# Patient Record
Sex: FEMALE | Race: BLACK OR AFRICAN AMERICAN | NOT HISPANIC OR LATINO | Employment: FULL TIME | URBAN - METROPOLITAN AREA
[De-identification: names, ages, dates, MRNs, and addresses within clinical notes are randomized per-mention and may not be internally consistent; named-entity substitution may affect disease eponyms.]

---

## 2017-03-16 ENCOUNTER — OFFICE VISIT (OUTPATIENT)
Dept: NEUROLOGY | Facility: MEDICAL CENTER | Age: 52
End: 2017-03-16
Payer: COMMERCIAL

## 2017-03-16 VITALS
BODY MASS INDEX: 27.89 KG/M2 | DIASTOLIC BLOOD PRESSURE: 80 MMHG | OXYGEN SATURATION: 97 % | TEMPERATURE: 98.2 F | HEIGHT: 68 IN | HEART RATE: 82 BPM | SYSTOLIC BLOOD PRESSURE: 108 MMHG | WEIGHT: 184 LBS | RESPIRATION RATE: 16 BRPM

## 2017-03-16 DIAGNOSIS — R93.0 ABNORMAL MRI OF HEAD: Primary | ICD-10-CM

## 2017-03-16 DIAGNOSIS — G25.0 BENIGN FAMILIAL TREMOR: ICD-10-CM

## 2017-03-16 DIAGNOSIS — G43.009 MIGRAINE WITHOUT AURA AND WITHOUT STATUS MIGRAINOSUS, NOT INTRACTABLE: ICD-10-CM

## 2017-03-16 PROCEDURE — 99205 OFFICE O/P NEW HI 60 MIN: CPT | Performed by: PSYCHIATRY & NEUROLOGY

## 2017-03-16 RX ORDER — ASPIRIN 325 MG
325 TABLET ORAL EVERY 6 HOURS PRN
COMMUNITY
End: 2017-05-25

## 2017-03-16 RX ORDER — MEDROXYPROGESTERONE ACETATE 150 MG/ML
150 INJECTION, SUSPENSION INTRAMUSCULAR ONCE
COMMUNITY
End: 2017-11-29

## 2017-03-16 ASSESSMENT — ENCOUNTER SYMPTOMS
HEADACHES: 0
DEPRESSION: 0
SENSORY CHANGE: 0
FOCAL WEAKNESS: 0
BLURRED VISION: 1
MEMORY LOSS: 0
TINGLING: 0
TREMORS: 1
DOUBLE VISION: 0

## 2017-03-16 NOTE — MR AVS SNAPSHOT
"        Guerda Dirk   3/16/2017 4:00 PM   Office Visit   MRN: [id number]    Department:  Neurology [facility]   Dept Phone:  [phone]    Description:  Female : 1965   Provider:  Bradley Argueta M.D.           Reason for Visit     New Patient Multiple sclerosis      Allergies as of 3/16/2017     Allergen Noted Reactions    Other Food 2016   Itching, Swelling    Coconut and peanut  RXN=ongoing    Coconut Fatty Acids 2016       Coconut causes throat swelling per RN.     Peanut-Derived 2016       Peanuts cause throat swelling.       Vital Signs     Blood Pressure Pulse Temperature Respirations Height Weight    108/80 mmHg 82 36.8 °C (98.2 °F) 16 1.727 m (5' 7.99\") 83.462 kg (184 lb)    Body Mass Index Oxygen Saturation Smoking Status             27.98 kg/m2 97% Former Smoker         Basic Information     Date Of Birth Sex Race Ethnicity Preferred Language    1965 Female Black or  Non- English      Your appointments     May 25, 2017  4:40 PM   Follow Up Visit with Bradley Argueta M.D.   [facility] (--)    [address], [address]  [facility] [address] [phone]           You will be receiving a confirmation call a few days before your appointment from our automated call confirmation system.              Problem List              ICD-10-CM Priority Class Noted - Resolved    Degenerative cervical disc M50.30   2016 - Present      Health Maintenance     Patient has no pending health maintenance at this time      Current Immunizations     No immunizations on file.      Below and/or attached are the medications your provider expects you to take. Review all of your home medications and newly ordered medications with your provider and/or pharmacist. Follow medication instructions as directed by your provider and/or pharmacist. Please keep your medication list with you and share with your provider. Update the information when " medications are discontinued, doses are changed, or new medications (including over-the-counter products) are added; and carry medication information at all times in the event of emergency situations     Allergies:  OTHER FOOD - Itching,Swelling     COCONUT FATTY ACIDS - (reactions not documented)     PEANUT-DERIVED - (reactions not documented)               Medications  Valid as of: March 16, 2017 -  4:53 PM    Generic Name Brand Name Tablet Size Instructions for use    Aspirin (Tab)  MG Take 325 mg by mouth every 6 hours as needed.        Cyclobenzaprine HCl (Tab) FLEXERIL 10 MG Take 1 Tab by mouth 3 times a day as needed for Muscle Spasms.        DULoxetine HCl (Cap DR Particles) CYMBALTA 60 MG Take 60 mg by mouth every day.        Eletriptan Hydrobromide (Tab) RELPAX 40 MG Take 40 mg by mouth Once PRN.        MedroxyPROGESTERone Acetate (Suspension) DEPO-PROVERA 150 MG/ mg by Intramuscular route Once.        MethylPREDNISolone (Kit) MEDROL DOSEPACK 4 MG Take as directed        Non Formulary Request Non Formulary Request  Take 1 Each by mouth every day. Amira Flush laxative  daily        Ondansetron (TABLET DISPERSIBLE) ZOFRAN ODT 4 MG Take 4 mg by mouth every 8 hours as needed for Nausea/Vomiting.        Sennosides   Take 1 Tab by mouth every day.        Zolpidem Tartrate (Tab) AMBIEN 5 MG Take 10 mg by mouth at bedtime as needed for Sleep.        .                 Medicines prescribed today were sent to:     [id number] [address] [facility]    [address] [address]    Phone: [phone] Fax: [phone]    Open 24 Hours?: No      Medication refill instructions:       If your prescription bottle indicates you have medication refills left, it is not necessary to call your provider’s office. Please contact your pharmacy and they will refill your medication.    If your prescription bottle indicates you do not have any refills left, you may request  refills at any time through one of the following ways: The online [employer] system (except Urgent Care), by calling your provider’s office, or by asking your pharmacy to contact your provider’s office with a refill request. Medication refills are processed only during regular business hours and may not be available until the next business day. Your provider may request additional information or to have a follow-up visit with you prior to refilling your medication.   *Please Note: Medication refills are assigned a new Rx number when refilled electronically. Your pharmacy may indicate that no refills were authorized even though a new prescription for the same medication is available at the pharmacy. Please request the medicine by name with the pharmacy before contacting your provider for a refill.           [employer] Access Code: [email or web address]  Expires: 4/15/2017  3:42 PM    [employer]  A secure, online tool to manage your health information     [employer]® is a secure, online tool that connects you to your personalized health information from the privacy of your home -- day or night - making it very easy for you to manage your healthcare. Once the activation process is completed, you can even access your medical information using the [employer] reshma, which is available for free in the Apple Reshma store or Google Play store.     [employer] provides the following levels of access (as shown below):   My Chart Features   [facility] Doctor [facility]  Specialists [facility]  Urgent  Care Non-[facility]  Primary Care  Doctor   Email your healthcare team securely and privately 24/7 X X X    Manage appointments: schedule your next appointment; view details of past/upcoming appointments X      Request prescription refills. X      View recent personal medical records, including lab and immunizations X X X X   View health record, including health history, allergies, medications X X X X   Read reports about your outpatient visits,  procedures, consult and ER notes X X X X   See your discharge summary, which is a recap of your hospital and/or ER visit that includes your diagnosis, lab results, and care plan. X X       How to register for [employer]:  1. Go to  [email or web address].  2. Click on the Sign Up Now box, which takes you to the New Member Sign Up page. You will need to provide the following information:  a. Enter your [employer] Access Code exactly as it appears at the top of this page. (You will not need to use this code after you’ve completed the sign-up process. If you do not sign up before the expiration date, you must request a new code.)   b. Enter your date of birth.   c. Enter your home email address.   d. Click Submit, and follow the next screen’s instructions.  3. Create a [employer] ID. This will be your [employer] login ID and cannot be changed, so think of one that is secure and easy to remember.  4. Create a [employer] password. You can change your password at any time.  5. Enter your Password Reset Question and Answer. This can be used at a later time if you forget your password.   6. Enter your e-mail address. This allows you to receive e-mail notifications when new information is available in [employer].  7. Click Sign Up. You can now view your health information.    For assistance activating your [employer] account, call [phone]

## 2017-03-17 ENCOUNTER — HOSPITAL ENCOUNTER (OUTPATIENT)
Dept: RADIOLOGY | Facility: MEDICAL CENTER | Age: 52
End: 2017-03-17

## 2017-03-17 NOTE — PROGRESS NOTES
Subjective:      Guerda Cavazos is a 51 y.o. female who presents for consultation, with a history of bilateral eye inflammation including allergic reaction and increased intraocular pressure, but whose MRI scan of the brain was abnormal suggestive of possible MS.     HPI    Ms. Cavazos is a pleasant right-handed -American female whose eyes symptoms began in September, 2016. She had some transient visual obscurations, involving both eyes, right greater than left. She was given some eyedrops and immediately developed a severe allergic reaction, affecting acuity bilaterally. It required ophthalmic steroids and other eyedrops. Intraocular pressures were found to be elevated. She followed under the care of her ophthalmologist throughout this time. Over a couple of months things slowly and steadily improved, the visual obscuration as well, but during this time MRI scan of the orbits as well as the brain were also done. The former study was normal, the latter revealed nonspecific white matter changes involving the deep white matter and juxtacortical regions of both cerebral hemispheres, with an anterior predominance (?). As a result, she was forwarded to several specialists including a rheumatologist and neurologist. She also was sent to a retinal specialist and evidently they found nothing of significance that required further follow-up.    Initially blood work was unremarkable, sedimentation rate was 2. Seen by Dr. Lorenzo from a neurologic standpoint, carotid ultrasound and CSF studies were obtained, but she does not know these results. Because of insurance changes, she was never able to follow-up. Her referral to a rheumatologist is pending.    She denies any history of transient neurologic deficits lasting for days or weeks at a time with spontaneous resolution. His includes nothing to suggest paraparesis, hemiparesis or sensory deficit, JAIRO, changes in bowel or bladder function, constrictive sensations around  the mid rib or chest, etc. She is not dealing with fatigue, even with the ophthalmologic problems, there was never double vision. Cognition has remained intact throughout. She has some mild chronic dyspnea, she denies hemoptysis or hematemesis, persistent pleurisy, or increasing shortness of breath.    She has a history of migraine without aura, starting in mid adult life. These are incapacitating, nausea and vomiting, heightened sensitivities, etc. She has never had any neurologic symptoms in association. These are infrequent, she uses Relpax with good benefit. She has a history of sickle cell trait and has noted tremulousness in the right hand, increasing with action, improved when she is at rest. Medical history is otherwise unremarkable for cerebrovascular risk including KIERAN, nor diagnose demyelinating disease, neurodegenerative disease, rheumatoid disease, sarcoid, anemia, liver or kidney disease, malignancy, thyroid disease, dyslipidemia, pulmonary disease, or seizures. She is status post cervical fusion surgeries on 2 different occasions, one in July, 2015, the next in April, 2016, these for neck pain and left upper extremity paresthesias and weakness. No one in the family has a history of stroke or demyelinating disease. Her mother has tremor, breast cancer and hypertension, both her brothers have hypertension, her one daughter is alive and well. She quit tobacco use in 2013, she drinks maybe 2 alcoholic beverages every once in a while. 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 Her significant other is a private .    She is on Relpax 40 mg daily, Depo Provera, adult aspirin daily, Cymbalta 60 mg daily, the rest as per the electronic health record, of no clinical significance from a neurologic standpoint.    Review of Systems   Constitutional: Negative for malaise/fatigue.   Eyes: Positive for blurred vision. Negative for double vision.   Neurological:  "Positive for tremors. Negative for tingling, sensory change, focal weakness and headaches.   Psychiatric/Behavioral: Negative for depression and memory loss.   All other systems reviewed and are negative.       Objective:     /80 mmHg  Pulse 82  Temp(Src) 36.8 °C (98.2 °F)  Resp 16  Ht 1.727 m (5' 7.99\")  Wt 83.462 kg (184 lb)  BMI 27.98 kg/m2  SpO2 97%     Physical Exam    She appears in no acute distress. Vital signs are stable. There was no malar rash, jaw or temporal tenderness, or jaw claudication. The neck is supple, carotid pulses are present bilaterally without asymmetry or bruits. Cardiac evaluation reveals a regular rhythm. There is no evidence of lower extremity edema, nor evidence of diffuse rash or bruising.    Fully oriented, there is no aphasia, agnosia, apraxia, or inattention.    PERRLA/EOMI, there is a reduction in visual acuity bilaterally with a hand-held chart, visual fields are still full to finger counting bilaterally, funduscopic exam does not reveal pallor, disc margins are sharp. There is no facial asymmetry, sensory exam is intact to temperature and pinprick across the midline, the tongue and uvula are midline, jaw jerk is present, shoulder shrug is symmetric. There is no bulbar dysfunction, hypophonia or bradykinesia.    A fine tremulousness is seen with the right hand when it is held against gravity, of low amplitude high frequency. Tone is normal throughout, there is no tremor at rest, there is no drift or asterixis. Strength and tone are intact throughout. Reflexes are present at all points though the left triceps jerk is slightly diminished when compared to the right, the rest are symmetric. Both toes are downgoing.    Repetitive movements with the hands, fingers and feet are intact with symmetric and normal amplitude and frequencies. There is no appendicular dystaxia, the tremulousness with right arm and hand is present will for range of motion, does not increase as she " approaches the target. She walks with normal station, heel, toe, and tandem walking are intact.    Sensory exam is intact to temperature, vibration and pinprick throughout. Romberg is absent.     Assessment/Plan:     1. Abnormal MRI of head  I'm not quite sure what to make of the findings by report, we will obtain a copy of the actual images so that I can review them. Nonspecific white matter lesions are just that, nonspecific and typically nondiagnostic. They can be seen as part of normal aging, migraine suffers also are at risk, they can also be seen in people with risk for cerebrovascular, atherosclerotic disease, though she lacks a history of risk other than her tobacco use and race. These are not likely a cause for the eye symptoms nor the eye problems she has suffered from. Still, she did see Dr. Lorenzo, and it seems that he probably did the right workup including CSF studies, blood work, carotid ultrasounds, and I would assume an echocardiogram. The differential would include atherosclerotic disease, demyelinating disease (I doubt this simply based on the fact of age, race, absence of history of recurrent relapsing neurologic deficits, and normal neurologic exam), vasculitis, granulomatous disease, infection, encephalitis, etc. I will not be ordering additional tests until I know what has already been done and what these results are. With a normal exam, a benign history, etc., I doubt we are going to find something significant.    2. Benign familial tremor  An incidental finding, this is not Parkinson's disease. I recommended she have a drink to see if in fact there is improvement, and if so, the diagnosis is cinched. The nature of benign familial tremor was reviewed in full.    3. Migraine without aura and without status migrainosus, not intractable  Stable at this time, she is using Relpax appropriately. This may be part of the reason why we are seeing these white matter changes on imaging, though it is  typically migraine with aura that an increased incidence of these abnormalities. The distribution is more often a posterior white matter localization as well. Still, I need to review the images to get a better sense of where things are. Face-to-face time was spent reviewing all of this as well.    We can follow up with each other once I have all of the data available for review, additional tests can then be ordered. He will continue to follow-up with her ophthalmologist, evidently she is scheduled for cataract surgery next week.

## 2017-05-17 ENCOUNTER — TELEPHONE (OUTPATIENT)
Dept: NEUROLOGY | Facility: MEDICAL CENTER | Age: 52
End: 2017-05-17

## 2017-05-17 NOTE — TELEPHONE ENCOUNTER
Pt is calling and asking if she should keep her appointment with Dr. Argueta. Informed pt that since she is still having the migraines she should keep her appointment with the him. Pt verbally understood and will comply with all given. EMMANUEL

## 2017-05-25 ENCOUNTER — OFFICE VISIT (OUTPATIENT)
Dept: NEUROLOGY | Facility: MEDICAL CENTER | Age: 52
End: 2017-05-25
Payer: COMMERCIAL

## 2017-05-25 VITALS
WEIGHT: 184 LBS | DIASTOLIC BLOOD PRESSURE: 86 MMHG | TEMPERATURE: 98.6 F | BODY MASS INDEX: 27.89 KG/M2 | HEART RATE: 79 BPM | RESPIRATION RATE: 16 BRPM | HEIGHT: 68 IN | SYSTOLIC BLOOD PRESSURE: 124 MMHG | OXYGEN SATURATION: 97 %

## 2017-05-25 DIAGNOSIS — G43.009 MIGRAINE WITHOUT AURA AND WITHOUT STATUS MIGRAINOSUS, NOT INTRACTABLE: Primary | ICD-10-CM

## 2017-05-25 DIAGNOSIS — R93.0 ABNORMAL MRI OF HEAD: ICD-10-CM

## 2017-05-25 DIAGNOSIS — G25.0 BENIGN FAMILIAL TREMOR: ICD-10-CM

## 2017-05-25 PROCEDURE — 99214 OFFICE O/P EST MOD 30 MIN: CPT | Performed by: PSYCHIATRY & NEUROLOGY

## 2017-05-25 RX ORDER — ALMOTRIPTAN 12.5 MG/1
TABLET, FILM COATED ORAL
Qty: 12 TAB | Refills: 6 | Status: SHIPPED | OUTPATIENT
Start: 2017-05-25 | End: 2017-11-29

## 2017-05-25 RX ORDER — PROPRANOLOL HCL 60 MG
CAPSULE, EXTENDED RELEASE 24HR ORAL
Qty: 60 CAP | Refills: 3 | Status: SHIPPED | OUTPATIENT
Start: 2017-05-25 | End: 2017-09-15

## 2017-05-25 RX ORDER — OXYCODONE HYDROCHLORIDE 5 MG/1
5 TABLET ORAL EVERY 4 HOURS PRN
COMMUNITY
End: 2017-11-29

## 2017-05-25 ASSESSMENT — ENCOUNTER SYMPTOMS
TREMORS: 1
BLURRED VISION: 1
DEPRESSION: 0
MEMORY LOSS: 0
HEADACHES: 1
LOSS OF CONSCIOUSNESS: 0

## 2017-05-25 NOTE — MR AVS SNAPSHOT
"        Guerda Cavazos   2017 4:40 PM   Office Visit   MRN: [id number]    Department[facility]  [facility]   Dept Phone:  [phone]    Description:  Female : 1965   Provider:  Bradley Argueta M.D.           Reason for Visit     Follow-Up Migraine, Relpax not working. Pt states that she was diagnosed with UCTD, prescribed methotrexate by Dr. Geneva Murillo. Advised to stop Relpax by Dr. Reid. Pt has also noticed unexplained bruising, and weakness while gripping. Still experiencing trouble with her eyes.      Allergies as of 2017     Allergen Noted Reactions    Other Food 2016   Itching, Swelling    Coconut and peanut  RXN=ongoing    Coconut Fatty Acids 2016       Coconut causes throat swelling per RN.     Peanut-Derived 2016       Peanuts cause throat swelling.       You were diagnosed with     Migraine without aura and without status migrainosus, not intractable   [[id number]]  -  Primary     Benign familial tremor   [[id number]]         Vital Signs     Blood Pressure Pulse Temperature Respirations Height Weight    124/86 mmHg 79 37 °C (98.6 °F) 16 1.727 m (5' 8\") 83.462 kg (184 lb)    Body Mass Index Oxygen Saturation Smoking Status             27.98 kg/m2 97% Former Smoker         Basic Information     Date Of Birth Sex Race Ethnicity Preferred Language    1965 Female Black or  Non- English      Problem List              ICD-10-CM Priority Class Noted - Resolved    Degenerative cervical disc M50.30   2016 - Present    Benign familial tremor G25.0   3/16/2017 - Present    Abnormal MRI of head R93.0   3/16/2017 - Present    Migraine without aura and without status migrainosus, not intractable G43.009   3/16/2017 - Present      Health Maintenance        Date Due Completion Dates    IMM DTaP/Tdap/Td Vaccine (1 - Tdap) 1983 ---    PAP SMEAR 1985 ---    MAMMOGRAM 2004 ---    COLONOSCOPY 2014 ---            Current Immunizations  "    No immunizations on file.      Below and/or attached are the medications your provider expects you to take. Review all of your home medications and newly ordered medications with your provider and/or pharmacist. Follow medication instructions as directed by your provider and/or pharmacist. Please keep your medication list with you and share with your provider. Update the information when medications are discontinued, doses are changed, or new medications (including over-the-counter products) are added; and carry medication information at all times in the event of emergency situations     Allergies:  OTHER FOOD - Itching,Swelling     COCONUT FATTY ACIDS - (reactions not documented)     PEANUT-DERIVED - (reactions not documented)               Medications  Valid as of: May 25, 2017 -  5:07 PM    Generic Name Brand Name Tablet Size Instructions for use    Almotriptan Malate (Tab) AXERT 12.5 MG 1 tab at headache onset; repeat in 1 hour prn        MedroxyPROGESTERone Acetate (Suspension) DEPO-PROVERA 150 MG/ mg by Intramuscular route Once.        Non Formulary Request Non Formulary Request  Take 1 Each by mouth every day. Amira Flush laxative  daily        Ondansetron (TABLET DISPERSIBLE) ZOFRAN ODT 4 MG Take 4 mg by mouth every 8 hours as needed for Nausea/Vomiting.        OxyCODONE HCl (Tab) ROXICODONE 5 MG Take 5 mg by mouth every four hours as needed for Severe Pain.        Propranolol HCl (CAPSULE SR 24 HR) INDERAL LA 60 MG 1 tab qAM for 1 week, then 2 tab qAM        Zolpidem Tartrate (Tab) AMBIEN 5 MG Take 10 mg by mouth at bedtime as needed for Sleep.        .                 Medicines prescribed today were sent to:     [id number] [address] [address]    Phone: [phone] Fax: [phone]    Open 24 Hours?: No      Medication refill instructions:       If your prescription bottle indicates you have medication refills left, it is  not necessary to call your provider’s office. Please contact your pharmacy and they will refill your medication.    If your prescription bottle indicates you do not have any refills left, you may request refills at any time through one of the following ways: The online [employer] system (except Urgent Care), by calling your provider’s office, or by asking your pharmacy to contact your provider’s office with a refill request. Medication refills are processed only during regular business hours and may not be available until the next business day. Your provider may request additional information or to have a follow-up visit with you prior to refilling your medication.   *Please Note: Medication refills are assigned a new Rx number when refilled electronically. Your pharmacy may indicate that no refills were authorized even though a new prescription for the same medication is available at the pharmacy. Please request the medicine by name with the pharmacy before contacting your provider for a refill.           [employer] Access Code: Activation code not generated  Current [employer] Status: Active

## 2017-05-26 NOTE — PROGRESS NOTES
Subjective:      Guerda Cavazos is a 51 y.o. female who presents for follow-up with a history of migraine without aura, benign familial tremor and an incidentally abnormal MRI of the brain.    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 Still, she was having maybe 5-6 headaches every month, rescue was effective if she could take them at the right time. Unfortunately, when methotrexate was initiated, the headaches ramped up significantly, she began to notice skin hyperpathia on both sides of the face. Relpax had been working, but this limited efficacy has now become a chapter in the history books. She also has failed Imitrex and Maxalt. When last seen, we had not started her on maintenance therapy since the eye issues were more paramount. In the past she has failed Topamax and Depakote.    Abnormal MRI: I did finally get a copy of films on CD-ROM, the abnormalities were nonspecific, nothing really consistent with demyelinating disease.    Benign tremor: Minimal, never impairing her ability to function nor becoming an embarrassment, it remains in the hands.    She did undergo cervical decompressive surgery, this provided significant benefit for the neck pain, obviously, but also for the headaches. Medical, surgical and family histories are reviewed otherwise, there are no new drug allergies. She was taken off methotrexate, given leucovorin, they are now deciding what medication she might be able to use effectively. She is on Ambien, Zofran and Depo Provera.    Review of Systems   Eyes: Positive for blurred vision.   Neurological: Positive for tremors and headaches. Negative for loss of consciousness.   Psychiatric/Behavioral: Negative for depression and memory loss.   All other systems reviewed and are negative.       Objective:     /86 mmHg  Pulse 79  Temp(Src) 37 °C  "(98.6 °F)  Resp 16  Ht 1.727 m (5' 8\")  Wt 83.462 kg (184 lb)  BMI 27.98 kg/m2  SpO2 97%     Physical Exam    She appears in no acute distress. Vital signs are stable. There is no malar rash, jaw claudication, there is no facial or scalp allodynia. The neck is supple, range of motion is full. Cardiac evaluation is unremarkable. In quick and cursory fashion, mental status, cranial nerve, motor, coordination, sensory evaluation of remain intact.     Assessment/Plan:     1. Migraine without aura and without status migrainosus, not intractable  She will likely require maintenance therapy, the hopefully in time we will get her back to the headache frequency she had prior to being on methotrexate. Inderal LA 60 mg daily for one week, then 120 mg daily. Side effects were reviewed. Axert 12.5 mg we used as rescue, taken like she had Relpax, but she was told to repeat the dose in an hour if she needed to. Phone contact in the interim. Six-month follow-up is scheduled.    - propranolol LA (INDERAL LA) 60 MG CAPSULE SR 24 HR; 1 tab qAM for 1 week, then 2 tab qAM  Dispense: 60 Cap; Refill: 3  - almotriptan (AXERT) 12.5 MG tablet; 1 tab at headache onset; repeat in 1 hour prn  Dispense: 12 Tab; Refill: 6    2. Benign familial tremor  Stable, Inderal is something that can help the tremulousness itself, we may kill 2 birds with one stone in this regard.    - propranolol LA (INDERAL LA) 60 MG CAPSULE SR 24 HR; 1 tab qAM for 1 week, then 2 tab qAM  Dispense: 60 Cap; Refill: 3    3. Abnormal MRI of head  Most likely coincidental, possibly even a normal variant, the patient's with migraine can have these types of changes. Was very happy to tell her I did not believe that these were lesions consistent with demyelinating disease. MRI of the cervical spine which was done prior to her decompressive surgery demonstrated no intramedullary cord signal abnormality, also a good sign that the brain abnormalities were not signs of a more " diffuse, CNS demyelinating disease.    Time: Evaluation of 25 minutes for exam come review, discussion, education  Discussion: As mentioned in assessment, over 50% of time spent face-to-face counseling and coordinating care

## 2017-09-12 ENCOUNTER — TELEPHONE (OUTPATIENT)
Dept: NEUROLOGY | Facility: MEDICAL CENTER | Age: 52
End: 2017-09-12

## 2017-09-12 DIAGNOSIS — G43.009 MIGRAINE WITHOUT AURA AND WITHOUT STATUS MIGRAINOSUS, NOT INTRACTABLE: ICD-10-CM

## 2017-09-12 NOTE — TELEPHONE ENCOUNTER
Received a call from this patient for Dr. Argueta stating that the medications for her migraines are not working. She is wondering if she could have a better/ different rescue medication as the ones she currently has are not very effective. Please advise.

## 2017-09-12 NOTE — TELEPHONE ENCOUNTER
Find out the patient if she is still on methotrexate, the headaches began to increase when she had started this drug. I had informed her this could be a problem. Make sure she is also on Inderal  mg daily, the minimum dose required to see if there is going to be benefit. Also see if her tremor has improved with the Inderal. If she is still on methotrexate, has been on the higher dose of Inderal, the headaches obviously persisting, we can try another maintenance drug, but I suspect we are not going to get any benefit until she gets off methotrexate. If she is off methotrexate, the headaches have continued unabated, then trying another maintenance therapy is still indicated, but at least she stands a chance of getting headaches under better control.

## 2017-09-15 RX ORDER — ONDANSETRON 4 MG/1
4 TABLET, ORALLY DISINTEGRATING ORAL EVERY 8 HOURS PRN
Qty: 30 TAB | Refills: 3 | Status: SHIPPED | OUTPATIENT
Start: 2017-09-15 | End: 2018-05-30 | Stop reason: SDUPTHER

## 2017-09-15 RX ORDER — NIMODIPINE 30 MG/1
30 CAPSULE, LIQUID FILLED ORAL 2 TIMES DAILY
Qty: 60 CAP | Refills: 4 | Status: SHIPPED | OUTPATIENT
Start: 2017-09-15 | End: 2017-09-21

## 2017-09-15 NOTE — TELEPHONE ENCOUNTER
Have her stop the Inderal, we will need to wait one week before starting another maintenance drug. I put Nimotop 30 mg, twice a day, into Epic.

## 2017-09-21 ENCOUNTER — TELEPHONE (OUTPATIENT)
Dept: NEUROLOGY | Facility: MEDICAL CENTER | Age: 52
End: 2017-09-21

## 2017-09-21 DIAGNOSIS — G43.009 MIGRAINE WITHOUT AURA AND WITHOUT STATUS MIGRAINOSUS, NOT INTRACTABLE: ICD-10-CM

## 2017-09-21 RX ORDER — AMLODIPINE BESYLATE 2.5 MG/1
TABLET ORAL
Qty: 120 TAB | Refills: 2 | Status: SHIPPED | OUTPATIENT
Start: 2017-09-21 | End: 2017-11-02 | Stop reason: SDUPTHER

## 2017-09-22 NOTE — TELEPHONE ENCOUNTER
Patient called for Dr. Argueta and is wondering if he can prescribe a different medication in place of the nimipodine? It does not require prior authorization but her copay is over $300 and she can't afford this. Can you prescribe something cheaper?

## 2017-11-02 DIAGNOSIS — G43.009 MIGRAINE WITHOUT AURA AND WITHOUT STATUS MIGRAINOSUS, NOT INTRACTABLE: ICD-10-CM

## 2017-11-04 RX ORDER — AMLODIPINE BESYLATE 2.5 MG/1
TABLET ORAL
Qty: 120 TAB | Refills: 2 | Status: SHIPPED | OUTPATIENT
Start: 2017-11-04 | End: 2017-11-29

## 2017-11-29 ENCOUNTER — OFFICE VISIT (OUTPATIENT)
Dept: NEUROLOGY | Facility: MEDICAL CENTER | Age: 52
End: 2017-11-29
Payer: COMMERCIAL

## 2017-11-29 VITALS
WEIGHT: 189.9 LBS | OXYGEN SATURATION: 97 % | HEART RATE: 84 BPM | HEIGHT: 68 IN | TEMPERATURE: 97 F | SYSTOLIC BLOOD PRESSURE: 108 MMHG | DIASTOLIC BLOOD PRESSURE: 78 MMHG | RESPIRATION RATE: 14 BRPM | BODY MASS INDEX: 28.78 KG/M2

## 2017-11-29 DIAGNOSIS — G43.009 MIGRAINE WITHOUT AURA AND WITHOUT STATUS MIGRAINOSUS, NOT INTRACTABLE: Primary | ICD-10-CM

## 2017-11-29 PROCEDURE — 99214 OFFICE O/P EST MOD 30 MIN: CPT | Performed by: PSYCHIATRY & NEUROLOGY

## 2017-11-29 RX ORDER — GABAPENTIN 300 MG/1
CAPSULE ORAL
Qty: 120 CAP | Refills: 5 | Status: SHIPPED | OUTPATIENT
Start: 2017-11-29 | End: 2017-12-18 | Stop reason: SDUPTHER

## 2017-11-29 RX ORDER — FOLIC ACID 1 MG/1
TABLET ORAL
COMMUNITY
Start: 2017-09-01 | End: 2020-07-21

## 2017-11-29 RX ORDER — MYCOPHENOLATE MOFETIL 250 MG/1
CAPSULE ORAL
COMMUNITY
Start: 2017-10-19 | End: 2021-07-07

## 2017-11-29 RX ORDER — FROVATRIPTAN SUCCINATE 2.5 MG/1
TABLET, FILM COATED ORAL
Qty: 12 TAB | Refills: 4 | Status: SHIPPED | OUTPATIENT
Start: 2017-11-29 | End: 2018-01-05

## 2017-11-29 ASSESSMENT — ENCOUNTER SYMPTOMS
DEPRESSION: 0
BLURRED VISION: 1
PHOTOPHOBIA: 0
MEMORY LOSS: 0
HEADACHES: 1

## 2017-11-29 ASSESSMENT — PAIN SCALES - GENERAL: PAINLEVEL: 7=MODERATE-SEVERE PAIN

## 2017-11-29 ASSESSMENT — PATIENT HEALTH QUESTIONNAIRE - PHQ9: CLINICAL INTERPRETATION OF PHQ2 SCORE: 0

## 2017-11-30 NOTE — PROGRESS NOTES
"Subjective:      Guerda Cavazos is a 52 y.o. female who presents for follow-up with a history of intractable migraine without aura.     HPI    Unfortunately, even off methotrexate, the headaches have continued at an increased rate. She is now averaging anywhere from 10-12 headache days in a month. The headaches are not necessarily occurring on consecutive days. They do tend to occur in the afternoons. Axert has provided limited benefit, she is already failed Imitrex and Maxalt, Relpax had been effective but lost efficacy. Norvasc 5 mg, twice a day over the last 6 weeks has provided no benefit. If the headaches last after rescue, she takes Ambien at night, sleeps it off, typically awakens the next morning doing well.    Her inflammatory iritis continues to be problematic. Now on CellCept 750 mg, twice a day, things are simply being kept at bay. Medical, surgical and family histories reviewed in electronic health record, there are no new drug allergies. Her tremor is stable. She is on Norvasc 5 mg, twice a day, Axert 12.5 mg when necessary, CellCept 750 mg, twice a day, folic acid and Zofran as needed.    Review of Systems   Constitutional: Negative for malaise/fatigue.   Eyes: Positive for blurred vision. Negative for photophobia.   Neurological: Positive for headaches.   Psychiatric/Behavioral: Negative for depression and memory loss.   All other systems reviewed and are negative.       Objective:     /78   Pulse 84   Temp 36.1 °C (97 °F)   Resp 14   Ht 1.715 m (5' 7.5\")   Wt 86.1 kg (189 lb 14.4 oz)   SpO2 97%   BMI 29.30 kg/m²      Physical Exam    She appears in no acute distress. Her vital signs are stable. There is no malar rash, jaw or temporal tenderness, or jaw claudication. The neck is supple, range of motion is full. Cardiac evaluation is unremarkable. Including mental status, cranial nerve, motor, coordination and sensory examinations, the evaluation is fully intact.     Assessment/Plan: "     1. Migraine without aura and without status migrainosus, not intractable  Unfortunately, we will need to maintain preventative therapies, I will change to Neurontin titration, beginning 300 mg daily at bedtime, increasing by 300 mg increments weekly up to 1200 mg daily at bedtime. At the same time, we will cut Norvasc to 2.5 mg, twice a day for one week, then 2.5 mg daily for one week and then off to avoid rebound. Side effects of the gabapentin were reviewed. If she continues with this type of headache frequency over the next several months, she is averaging more than 15 day/month of intense pain, and as such having already failed Inderal, Norvasc and both Topamax and Depakote, she would fit criteria for Botox. For rescue, I will change to Frova 2.5 mg to be taken at headache onset and then repeated in one hour. She will discontinue the Axert. Face-to-face time was spent reviewing all the above. I'll follow-up over the next 4-5 months.    - gabapentin (NEURONTIN) 300 MG Cap; 1 tab at bed for 1 week, then 2 tab at bed for 1 week, then 3 tab at bed for 1 week, then 4 tab at bed.  Dispense: 120 Cap; Refill: 5  - Frovatriptan Succinate 2.5 MG Tab; 1 tab at headache onset; repeat in 1 hour prn  Dispense: 12 Tab; Refill: 4    Time: Evaluation of 25 minutes for exam, review, discussion, and education  Discussion: As mentioned in the assessment, over 50% of the time spent face-to-face counseling and coordinating care

## 2017-12-18 DIAGNOSIS — G43.009 MIGRAINE WITHOUT AURA AND WITHOUT STATUS MIGRAINOSUS, NOT INTRACTABLE: ICD-10-CM

## 2017-12-18 RX ORDER — GABAPENTIN 300 MG/1
1200 CAPSULE ORAL
Qty: 360 CAP | Refills: 2 | Status: SHIPPED | OUTPATIENT
Start: 2017-12-18 | End: 2019-04-18

## 2017-12-18 NOTE — TELEPHONE ENCOUNTER
Pt requesting a 90-day supply to [facility]. Will call and discontinue remaining refills at current pharmacy if approved.

## 2018-01-05 ENCOUNTER — TELEPHONE (OUTPATIENT)
Dept: NEUROLOGY | Facility: MEDICAL CENTER | Age: 53
End: 2018-01-05

## 2018-01-05 DIAGNOSIS — G43.009 MIGRAINE WITHOUT AURA AND WITHOUT STATUS MIGRAINOSUS, NOT INTRACTABLE: ICD-10-CM

## 2018-01-05 RX ORDER — ZOLMITRIPTAN 5 MG/1
TABLET, ORALLY DISINTEGRATING ORAL
Qty: 9 TAB | Refills: 1 | Status: SHIPPED | OUTPATIENT
Start: 2018-01-05 | End: 2019-04-18

## 2018-01-06 NOTE — TELEPHONE ENCOUNTER
We can try Zomig instead. A prescription for the Zomig-ZMT 5 mg wafers was sent, give her a heads up.

## 2018-01-06 NOTE — TELEPHONE ENCOUNTER
Patient called for Dr. Argueta and states that her rescue medication for her migraines, Frova, is ineffective and is also very expensive. Is there another medication she can try? Please advise.

## 2018-05-30 ENCOUNTER — OFFICE VISIT (OUTPATIENT)
Dept: NEUROLOGY | Facility: MEDICAL CENTER | Age: 53
End: 2018-05-30
Payer: COMMERCIAL

## 2018-05-30 VITALS
HEIGHT: 68 IN | OXYGEN SATURATION: 100 % | SYSTOLIC BLOOD PRESSURE: 124 MMHG | TEMPERATURE: 97.7 F | WEIGHT: 159.94 LBS | RESPIRATION RATE: 20 BRPM | DIASTOLIC BLOOD PRESSURE: 60 MMHG | BODY MASS INDEX: 24.24 KG/M2 | HEART RATE: 69 BPM

## 2018-05-30 PROCEDURE — 99215 OFFICE O/P EST HI 40 MIN: CPT | Performed by: PHYSICIAN ASSISTANT

## 2018-05-30 RX ORDER — TOLTERODINE 4 MG/1
4 CAPSULE, EXTENDED RELEASE ORAL DAILY
COMMUNITY
End: 2019-04-18

## 2018-05-30 RX ORDER — ONDANSETRON 4 MG/1
4 TABLET, ORALLY DISINTEGRATING ORAL EVERY 8 HOURS PRN
Qty: 30 TAB | Refills: 3 | Status: SHIPPED | OUTPATIENT
Start: 2018-05-30 | End: 2022-06-21

## 2018-05-30 RX ORDER — BUTALBITAL, ACETAMINOPHEN AND CAFFEINE 50; 325; 40 MG/1; MG/1; MG/1
1 TABLET ORAL EVERY 4 HOURS PRN
COMMUNITY
End: 2018-09-24 | Stop reason: SDUPTHER

## 2018-05-30 RX ORDER — NEOMYCIN SULFATE 500 MG/1
1000 TABLET ORAL 4 TIMES DAILY
COMMUNITY
End: 2019-04-18

## 2018-05-30 RX ORDER — CYCLOSPORINE 0.5 MG/ML
1 EMULSION OPHTHALMIC 2 TIMES DAILY
COMMUNITY

## 2018-05-30 NOTE — PATIENT INSTRUCTIONS
Acute/Rescue Medications: max use 9 days monthly - use calendar to track and bring to next visit    Triptan - cambia powder packet  Nausea medication - zofran 4-8 mg at onset of nausea  NSAID or ibuprofen - do not take same day or time as cambia.  OK to use tylenol or fioricet same day or same time as the cambia.    Daily Preventative Treatment:  Vitamins - handout provided  Daily medicine - botox but keep taking zonisamide   Exercise program: continue current exercise 3 x week for 30 minutes or more      Other: botox  Authorization - sign form today and they will call you with details    Alternative medications - [facility] handout provided    Whole30 - review and consider some of the foods they discuss as triggering auto-immune    Come back in a month to discuss meds

## 2018-05-30 NOTE — PROGRESS NOTES
Subjective:      Guerda Cavazos is a 52 y.o. female who presents with New Patient (migraine)    Chief Complaint/Reason for referral:  headaches    History of Present Illness:   Describe your headaches to me: In 2010 she was officially diagnosed with migraines and has been seen by Dr. Mishra.  She saw Dr. Argueta in our office for possible MS.  She said he did not have MS and she couldn't get any response from Dr. Argueta when she called.    Dr. Reid has been treating her since January.    Migraines started around 2008.  She had a lot of headaches though and likely they were migraine.    Sometimes they wake her up, typically behind eyes both, she cannot read or function when they are at their worst.  Light sensitivity, sound sensitivity, nausea with these more severe migraines.    Sometimes she has less intense headaches - moderate today - woke up with it.     2-4 times week migraines are severe.  2-4 times a week with moderate migraine.    She has 1-2 days per week when she is completely headache free.    Duration of headaches? More than four hours    When did headaches start or why do you think you started having headaches?  No idea    Have your headaches started recently or changed recently? Not really but doesn't get good enough relief at this time.    Do you get an aura or any symptoms that typically begin PRIOR to headache onset?  Visual change    Are you nauseated or sick to your stomach when you have a headache?  y  Does light bother you when you have a headache?   _____y____  Does sound or noise bother you when you have a headache?   ____y_____    Neurologic symptoms with headaches (weakness, numbness, vertigo, speech changes, cognitive changes) no    Do you have any neck or jaw pain with headaches?    Two neck surgeries in the past 2015 and 2016 - she has hardware.    No jaw issues    Have you identified any triggers for your headaches (dehydration, poor sleep, low blood sugar, alcohol 35% (bernard wine)  chocolate 22%, cheese 9%, citrus fruit 11%)?      Never identified any real triggers but not sure she knows what they are.  She has tried several food diets in past.    Do you feel restless like you want to pace around with your headaches or do you feel like lying down to make your headache feel better/less severe? Lie down    Do headaches start by coughing, sneezing, bending over, Valsalva maneuver, sexual activity?  none    Do headaches start shortly after you lie down to go to bed or shortly after you get up from bed in the morning? random    Have you noticed a menstrual pattern to your headaches? Still is getting her period.    Have you ever kept a headache diary?    How many days do you keep ANY type of headache in any given month?  3 or fewer days______   Between 3 and 6 days______   Between 6 and 10 days_____  Between 11 and 14 days____  15 or more headache/days per month___X__  Has severe headaches _2-4___ days per month    Family members with headaches:  Brother with migraines    Co--morbid conditions:    Conditions that affect diagnosis and treatment:  Depression  N        Anxiety     N        Sleep disorders:   No -  Takes zolpidem    Obesity  N    History of TBI N            Pregnancy/family planning   N  Counseled on teratogenicity of migraine medications.  Patient verbalized their understanding.    Fibromyalgia   N    Social History:  Do you drink any caffeine? Yes__X___ No____   How many days per week?  2 or fewer days___X___  3 or more days______    Do you drink alcohol?  None    Do you use recreational drugs including medicinal marijuana? none    Do you smoke cigarettes? 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  Misses work or goes home early 3-4 days weekly    Who and where do you live? [place] alone    What is your exercise program:  Treadmill, bicycle    Have you had an MRI done?   Yes - no  "abnormality that patient can remember.  She had them when she had a couple supposed TIAs.    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    zomig melt didn't work  Never tried cambia;  Never tried migranal  Might have had sumatriptan as a spray  No injectible    What are you taking right now - daily - to prevent headaches?/dose    zonisamide 300 mg day  No supplement    Any prior prophylactic treatments:  Medications/dose/frequency/duration of treatment/worked or side effects?    neurontin tried and failed  Tried and failed topamax in [facility]  Tried and failed propranol - had a side effect likely related to feeling too tired but not sure  Elavil - tried and failed years ago in [facility]                                                                                                                     HPI    ROS       Objective:     /60   Pulse 69   Temp 36.5 °C (97.7 °F)   Resp 20   Ht 1.715 m (5' 7.5\")   Wt 72.6 kg (159 lb 15.1 oz)   SpO2 100%   BMI 24.68 kg/m²      Physical Exam            Assessment/Plan:     Chronic Migraine/migraine with aura:      Acute/Rescue Medications: max use 9 days monthly - use calendar to track and bring to next visit    Triptan - cambia powder packet  Nausea medication - zofran 4-8 mg at onset of nausea  NSAID or ibuprofen - do not take same day or time as cambia.  OK to use tylenol or fioricet same day or same time as the cambia.    Daily Preventative Treatment:  Vitamins - handout provided  Daily medicine - botox but keep taking zonisamide   Exercise program: continue current exercise 3 x week for 30 minutes or more      Other: botox  Authorization " - sign form today and they will call you with details    This patient has chronic daily migraines, defined as having 15 or more headaches days per month over a minimum of the last three months. Episodes last more than 4 hours (untreated).     This patient does not also have medication overuse headache.      Previous prophylactic treatments for at least three months have included:  topamax or depakote: topamax tried and failed  beta-blockers: tried and failed see above  Venlafaxine or a TCA: tried and failed pt reports having taken elavil in past     At this point the only option left would be to use BTX injections for chronic migraine.        Alternative medications - [facility]J handout provided    Whole30 - review and consider some of the foods they discuss as triggering auto-immune    Come back in a month to discuss meds    Total time with this visit:  45   Minutes face-to-face with patient. More than 50% of this visit was spent educating patient on their illness and/or coordinating care, as detailed above

## 2018-06-25 ENCOUNTER — TELEPHONE (OUTPATIENT)
Dept: NEUROLOGY | Facility: MEDICAL CENTER | Age: 53
End: 2018-06-25

## 2018-06-25 NOTE — TELEPHONE ENCOUNTER
Patient called and stated that her Botox finally got scheduled but she wasn't sure if she needed another follow up and how soon to schedule it.     I advised her that when she comes in for her Botox, her and Nan will determine how often she should be seen. I explained to patient that the Botox is only done every twelve weeks.     Patient stated that she is tapering down from four zonisamide daily to now three, and will be tapering down top two, and then be done with it.

## 2018-07-02 ENCOUNTER — OFFICE VISIT (OUTPATIENT)
Dept: NEUROLOGY | Facility: MEDICAL CENTER | Age: 53
End: 2018-07-02
Payer: COMMERCIAL

## 2018-07-02 VITALS
OXYGEN SATURATION: 99 % | HEART RATE: 80 BPM | TEMPERATURE: 97.9 F | SYSTOLIC BLOOD PRESSURE: 118 MMHG | HEIGHT: 68 IN | DIASTOLIC BLOOD PRESSURE: 80 MMHG | BODY MASS INDEX: 23.39 KG/M2 | WEIGHT: 154.32 LBS

## 2018-07-02 PROCEDURE — 64615 CHEMODENERV MUSC MIGRAINE: CPT | Performed by: PHYSICIAN ASSISTANT

## 2018-07-02 RX ORDER — DICLOFENAC POTASSIUM 50 MG/1
POWDER, FOR SOLUTION ORAL
COMMUNITY
End: 2019-04-18

## 2018-07-02 NOTE — PROGRESS NOTES
Established pt of mine here for initial botox.     treated this patient in clinic today with BotoxA 155 units according to the dosing/injection paradigm currently mandated by the FDA for the management of chronic migraine. Specifically, I injected 5 units to the procerus, 5 units to the corrugators bilaterally, a total of 20 units to the frontalis musculature, 20 units to the temporalis bilaterally, 15 units to the occipitalis bilaterally, 10 units to the cervical paraspinals bilaterally and 15 units to the trapezius musculature bilaterally.    Pt was advised of side effects associated with medication.    The remainder of the Botox 200 units mixed but not administered was discarded as wastage per FDA guidelines.

## 2018-09-24 ENCOUNTER — OFFICE VISIT (OUTPATIENT)
Dept: NEUROLOGY | Facility: MEDICAL CENTER | Age: 53
End: 2018-09-24
Payer: COMMERCIAL

## 2018-09-24 VITALS
HEART RATE: 60 BPM | OXYGEN SATURATION: 99 % | WEIGHT: 154 LBS | HEIGHT: 68 IN | DIASTOLIC BLOOD PRESSURE: 78 MMHG | TEMPERATURE: 98.8 F | SYSTOLIC BLOOD PRESSURE: 124 MMHG | BODY MASS INDEX: 23.34 KG/M2

## 2018-09-24 PROCEDURE — 64615 CHEMODENERV MUSC MIGRAINE: CPT | Performed by: PHYSICIAN ASSISTANT

## 2018-09-24 RX ORDER — BUTALBITAL, ACETAMINOPHEN AND CAFFEINE 50; 325; 40 MG/1; MG/1; MG/1
1 TABLET ORAL EVERY 4 HOURS PRN
Qty: 30 TAB | Refills: 5 | Status: SHIPPED | OUTPATIENT
Start: 2018-09-24 | End: 2019-07-15

## 2018-09-24 ASSESSMENT — PAIN SCALES - GENERAL: PAINLEVEL: 4=SLIGHT-MODERATE PAIN

## 2018-09-24 ASSESSMENT — PATIENT HEALTH QUESTIONNAIRE - PHQ9: CLINICAL INTERPRETATION OF PHQ2 SCORE: 0

## 2018-09-24 NOTE — PROGRESS NOTES
Established pt of mine here today for second botox injections.     treated this patient in clinic today with BotoxA 155 units according to the dosing/injection paradigm currently mandated by the FDA for the management of chronic migraine. Specifically, I injected 5 units to the procerus, 5 units to the corrugators bilaterally, a total of 20 units to the frontalis musculature, 20 units to the temporalis bilaterally, 15 units to the occipitalis bilaterally, 10 units to the cervical paraspinals bilaterally and 15 units to the trapezius musculature bilaterally.    Pt was advised of side effects associated with medication.    The remainder of the Botox 200 units mixed but not administered was discarded as wastage per FDA guidelines.    Pt isn't sure yet how well botox is working.  She is overusing medications however and would benefit from ONB.  We will get her in for those in about two weeks after insurance auth received.

## 2018-10-05 ENCOUNTER — TELEPHONE (OUTPATIENT)
Dept: NEUROLOGY | Facility: MEDICAL CENTER | Age: 53
End: 2018-10-05

## 2018-10-05 NOTE — TELEPHONE ENCOUNTER
Pt left me a VM I called her back and she answered, she discussed and did not want to do the ONB at this time due to financial issues. She also had to pay out of pocket for botox (1500$) because no one got back to her or did an auth. Nan had told her to do research and she found that muscle relaxers help a lot and she would like to know recommendations on what she can take.

## 2018-12-18 ENCOUNTER — OFFICE VISIT (OUTPATIENT)
Dept: NEUROLOGY | Facility: MEDICAL CENTER | Age: 53
End: 2018-12-18
Payer: COMMERCIAL

## 2018-12-18 VITALS
DIASTOLIC BLOOD PRESSURE: 60 MMHG | HEART RATE: 69 BPM | BODY MASS INDEX: 23.7 KG/M2 | WEIGHT: 156.4 LBS | TEMPERATURE: 97.9 F | SYSTOLIC BLOOD PRESSURE: 122 MMHG | HEIGHT: 68 IN | OXYGEN SATURATION: 98 %

## 2018-12-18 PROCEDURE — 64615 CHEMODENERV MUSC MIGRAINE: CPT | Performed by: PHYSICIAN ASSISTANT

## 2018-12-18 NOTE — PROGRESS NOTES
Cc:  chronic migraine - here today her third botox injections.      She has some frustrations related to cost of botox and also wasn't sure at last injection whether it was worth it.  Counseled at that time to avoid overusing medication.  I wanted to do ONB to help transition her off the med overuse, but she refused due to cost.  Today as I visit with her before performing botox she is no longer overusing medication - took something just 4-6 days at most last month.    Today she has considerably less pain with botox injections.  We discussed options as she still has some tenderness in ONB area and we will pursue a topical medicine from the specialty pharmacy in lieu of ONB due to cost and also suspect she will have significant tenderness with those injections.    I treated this patient in clinic today with BotoxA 175 units according to the dosing/injection paradigm currently mandated by the FDA for the management of chronic migraine. Specifically, I injected 5 units to the procerus, 5 units to the corrugators bilaterally, a total of 20 units to the frontalis musculature, 20 units to the temporalis bilaterally, 15 units to the occipitalis bilaterally, 10 units to the cervical paraspinals bilaterally and 15 units to the trapezius musculature bilaterally.    Pt was advised of side effects associated with medication.    I also injected an additional 10 units into the occipital belly of the occipitofrontalis each side because pt complained of reduced efficacy or effect wearing off prior to next dose.    The remainder of the Botox 200 units mixed but not administered was discarded as wastage per FDA guidelines.    Pt has used less medication for management of migraine and/or had less events requiring ER or urgent care appts due to intractable migraine or status migrainosis.  They are not migraine  Free however and I recommend medication be continued.    Next visit I will increase to 195 units if still unsure it is  helping.  After that we will have to consider whether patient should be switched to CGRP medications and considered a botox failure to respond.  She is improving as she has less migraines but still her migraines are debilitating and are happening 1-2 days per week.

## 2019-01-06 ENCOUNTER — HOSPITAL ENCOUNTER (OUTPATIENT)
Dept: RADIOLOGY | Facility: MEDICAL CENTER | Age: 54
End: 2019-01-06
Attending: PSYCHIATRY & NEUROLOGY
Payer: COMMERCIAL

## 2019-01-06 DIAGNOSIS — E05.00 BASEDOW'S DISEASE: ICD-10-CM

## 2019-01-06 PROCEDURE — 700117 HCHG RX CONTRAST REV CODE 255: Performed by: PSYCHIATRY & NEUROLOGY

## 2019-01-06 PROCEDURE — 70543 MRI ORBT/FAC/NCK W/O &W/DYE: CPT

## 2019-01-06 PROCEDURE — A9585 GADOBUTROL INJECTION: HCPCS | Performed by: PSYCHIATRY & NEUROLOGY

## 2019-01-06 RX ORDER — GADOBUTROL 604.72 MG/ML
7.5 INJECTION INTRAVENOUS ONCE
Status: COMPLETED | OUTPATIENT
Start: 2019-01-06 | End: 2019-01-06

## 2019-01-06 RX ADMIN — GADOBUTROL 7.5 ML: 604.72 INJECTION INTRAVENOUS at 13:36

## 2019-03-15 ENCOUNTER — APPOINTMENT (OUTPATIENT)
Dept: NEUROLOGY | Facility: MEDICAL CENTER | Age: 54
End: 2019-03-15
Payer: COMMERCIAL

## 2019-04-18 ENCOUNTER — OFFICE VISIT (OUTPATIENT)
Dept: MEDICAL GROUP | Facility: PHYSICIAN GROUP | Age: 54
End: 2019-04-18
Payer: COMMERCIAL

## 2019-04-18 VITALS
WEIGHT: 157.8 LBS | DIASTOLIC BLOOD PRESSURE: 78 MMHG | RESPIRATION RATE: 14 BRPM | HEIGHT: 68 IN | BODY MASS INDEX: 23.92 KG/M2 | OXYGEN SATURATION: 98 % | TEMPERATURE: 98.6 F | SYSTOLIC BLOOD PRESSURE: 112 MMHG | HEART RATE: 70 BPM

## 2019-04-18 DIAGNOSIS — M50.30 DEGENERATIVE CERVICAL DISC: ICD-10-CM

## 2019-04-18 DIAGNOSIS — H05.20 OCULAR PROPTOSIS: ICD-10-CM

## 2019-04-18 DIAGNOSIS — Z13.29 SCREENING FOR THYROID DISORDER: ICD-10-CM

## 2019-04-18 DIAGNOSIS — M35.9 CONNECTIVE TISSUE DISEASE, UNDIFFERENTIATED (HCC): ICD-10-CM

## 2019-04-18 DIAGNOSIS — Z23 NEED FOR VACCINATION: ICD-10-CM

## 2019-04-18 DIAGNOSIS — G43.009 MIGRAINE WITHOUT AURA AND WITHOUT STATUS MIGRAINOSUS, NOT INTRACTABLE: ICD-10-CM

## 2019-04-18 DIAGNOSIS — Z13.220 LIPID SCREENING: ICD-10-CM

## 2019-04-18 DIAGNOSIS — G47.00 INSOMNIA, UNSPECIFIED TYPE: ICD-10-CM

## 2019-04-18 DIAGNOSIS — Z13.6 SCREENING FOR CARDIOVASCULAR CONDITION: ICD-10-CM

## 2019-04-18 DIAGNOSIS — Z86.73 HX OF TRANSIENT ISCHEMIC ATTACK (TIA): ICD-10-CM

## 2019-04-18 PROCEDURE — 90471 IMMUNIZATION ADMIN: CPT | Performed by: INTERNAL MEDICINE

## 2019-04-18 PROCEDURE — 99204 OFFICE O/P NEW MOD 45 MIN: CPT | Mod: 25 | Performed by: INTERNAL MEDICINE

## 2019-04-18 PROCEDURE — 90715 TDAP VACCINE 7 YRS/> IM: CPT | Performed by: INTERNAL MEDICINE

## 2019-04-18 RX ORDER — TRAZODONE HYDROCHLORIDE 50 MG/1
50 TABLET ORAL NIGHTLY PRN
Qty: 14 TAB | Refills: 0 | Status: SHIPPED | OUTPATIENT
Start: 2019-04-18 | End: 2019-04-26

## 2019-04-18 RX ORDER — GLUCOSAMINE/D3/BOSWELLIA SERRA 1500MG-400
TABLET ORAL
COMMUNITY
End: 2020-04-08

## 2019-04-18 RX ORDER — ROSUVASTATIN CALCIUM 5 MG/1
5 TABLET, COATED ORAL EVERY EVENING
Qty: 30 TAB | Refills: 11 | Status: SHIPPED | OUTPATIENT
Start: 2019-04-18 | End: 2019-05-13 | Stop reason: SDUPTHER

## 2019-04-18 ASSESSMENT — PATIENT HEALTH QUESTIONNAIRE - PHQ9: CLINICAL INTERPRETATION OF PHQ2 SCORE: 0

## 2019-04-18 NOTE — LETTER
[facility]  Prince Hamilton M.D.  [address]  [facility] [address]  Fax: [phone]   Authorization for Release/Disclosure of   Protected Health Information   Name: DENNIS CUNNINGHAM : 1965 SSN: [id number]   Address: [facility] [address]  [facility] [address] Phone:    [phone] (home) [phone] (work)   I authorize the entity listed below to release/disclose the PHI below to:   [facility]/Prince Hamilton M.D. and Prince Hamilton M.D.   Provider or Entity Name:     Address   City, State, [facility]   Phone:      Fax:     Reason for request: continuity of care   Information to be released:    [  ] LAST COLONOSCOPY,  including any PATH REPORT and follow-up  [  ] LAST FIT/COLOGUARD RESULT [  ] LAST DEXA  [  ] LAST MAMMOGRAM  [  ] LAST PAP  [  ] LAST LABS [  ] RETINA EXAM REPORT  [  ] IMMUNIZATION RECORDS  [  ] Release all info      [  ] Check here and initial the line next to each item to release ALL health information INCLUDING  _____ Care and treatment for drug and / or alcohol abuse  _____ HIV testing, infection status, or AIDS  _____ Genetic Testing    DATES OF SERVICE OR TIME PERIOD TO BE DISCLOSED: _____________  I understand and acknowledge that:  * This Authorization may be revoked at any time by you in writing, except if your health information has already been used or disclosed.  * Your health information that will be used or disclosed as a result of you signing this authorization could be re-disclosed by the recipient. If this occurs, your re-disclosed health information may no longer be protected by State or Federal laws.  * You may refuse to sign this Authorization. Your refusal will not affect your ability to obtain treatment.  * This Authorization becomes effective upon signing and will  on (date) __________.      If no date is indicated, this Authorization will  one (1) year from the signature date.    Name: Dennis  Dirk    Signature:   Date:     4/18/2019       PLEASE FAX REQUESTED RECORDS BACK TO: [phone]

## 2019-04-19 NOTE — PROGRESS NOTES
Established Patient    Guerda Cavazos is a 53 y.o. female who presents today with the following:    CC:   Chief Complaint   Patient presents with   • Establish Care     rheumatoid arthritis, migraines       HPI:     Migraine without aura and without status migrainosus, not intractable  Follows with neurology stable. Tried botox did not help. Taking fioricet prn.    Ocular proptosis  Follow with ophthalmology and neuro-ophthalmology.    Hx of transient ischemic attack (TIA)  In 2010, no symptoms. Will initial aspirin and statin for stroke prevention. Benefits and risks of statin discussed.     Degenerative cervical disc  S/p anterior cervical fusion.     Connective tissue disease, undifferentiated (HCC)  Follow with rheumatology, Dr. Murillo. On cellcept  Intermittent raynaud's symptoms. Now stable. Rash resolved.    Insomnia  Difficulty falling asleep has been on zolpidem prn for many years. Has frequent urination during the night.   Sleep hygiene education provided  Willing to try trazodone prn, will not take trazodone and zolpidem together.       Current Outpatient Prescriptions   Medication Sig Dispense Refill   • Biotin 85519 MCG Tab Take  by mouth.     • Misc Natural Products (COLON CLEANSE) Cap Take  by mouth.     • traZODone (DESYREL) 50 MG Tab Take 1 Tab by mouth at bedtime as needed for Sleep. 14 Tab 0   • rosuvastatin (CRESTOR) 5 MG Tab Take 1 Tab by mouth every evening. 30 Tab 11   • aspirin EC (ECOTRIN) 81 MG Tablet Delayed Response Take 1 Tab by mouth every day. 30 Tab    • acetaminophen/caffeine/butalbital 325-40-50 mg (FIORICET) -40 MG Tab Take 1 Tab by mouth every four hours as needed for Headache. 30 Tab 5   • cyclosporin (RESTASIS) 0.05 % ophthalmic emulsion Place 1 Drop in both eyes 2 times a day.     • ondansetron (ZOFRAN ODT) 4 MG TABLET DISPERSIBLE Take 1 Tab by mouth every 8 hours as needed. 30 Tab 3   • mycophenolate (CELLCEPT) 250 MG Cap      • folic acid (FOLVITE) 1 MG Tab      •  "zolpidem (AMBIEN) 5 MG Tab Take 10 mg by mouth at bedtime as needed for Sleep.       No current facility-administered medications for this visit.        Allergies, past medical history, past surgical history, medications, family history, social history reviewed and updated.    ROS   Constitutional: Denies fevers or chills  Eyes: Denies changes in vision  Ears/Nose/Throat/Mouth: Denies nasal congestion or sore throat   Cardiovascular: Denies chest pain or palpitations   Respiratory: Denies shortness of breath , Denies cough  Gastrointestinal/Hepatic: Denies abd pain, nausea, vomiting   Genitourinary: Denies dysuria or frequency  Musculoskeletal/Rheum: Denies joint pain and swelling   Neurological: intermittent headache  Psychiatric: Denies mood disorder   Endocrine: Denies hx of diabetes or thyroid dysfunction  Heme/Oncology/Lymph Nodes: Denies weight changes or enlarged LNs.    Physical Exam  Vitals: /78 (BP Location: Left arm, Patient Position: Sitting, BP Cuff Size: Adult)   Pulse 70   Temp 37 °C (98.6 °F) (Temporal)   Resp 14   Ht 1.715 m (5' 7.5\")   Wt 71.6 kg (157 lb 12.8 oz)   LMP 02/01/2019 (Within Weeks)   SpO2 98%   BMI 24.35 kg/m²   General: Alert, pleasant, NAD  HEENT: Normocephalic.  EOMI, no icterus or pallor.  Conjunctivae and lids normal. External ears normal. Oropharynx non-erythematous, mucous membranes moist.  Neck supple.  No thyromegaly or masses palpated.   Lymph: No cervical or supraclavicular lymphadenopathy.  Cardiovascular: Regular rate and rhythm.  S1 and S2 normal.  No murmurs appreciated.  Respiratory: Normal respiratory effort.  Clear to auscultation bilaterally.  Abdomen: Non-distended, soft, non-tender  Skin: Warm, dry, no rashes.  Musculoskeletal: Gait is normal.  Moves all extremities well.  Extremities: No leg edema.  radial pulses 2+ symmetric.   Psych:  Affect/mood is normal, judgement is good, memory is intact, grooming is appropriate.      Labs (3/30/19) were " reviewed and discussed with patients.  All questions were answered.      Assessment and Plan    Guerda was seen today for establish care.    Diagnoses and all orders for this visit:    Ocular proptosis  -     TSH WITH REFLEX TO FT4; Future  - follow with neuroophthalmology and ophthalmology    Insomnia, unspecified type  -     traZODone (DESYREL) 50 MG Tab; Take 1 Tab by mouth at bedtime as needed for Sleep.  - sleep hygeine    Migraine without aura and without status migrainosus, not intractable  Follow with neurology, Fioricet prn    Need for vaccination  -     Tdap Vaccine =>6YO IM    Hx of transient ischemic attack (TIA)  -     rosuvastatin (CRESTOR) 5 MG Tab; Take 1 Tab by mouth every evening. Discussed benefits and risks.  - aspirin 81    Lipid screening  -     Lipid Profile; Future    Screening for cardiovascular condition  -     Comp Metabolic Panel; Future    Degenerative cervical disc  S/p surgery, stable.    Connective tissue disease, undifferentiated (HCC)  Follow with rheumatology. On Cellcept        Follow-up:Return if symptoms worsen or fail to improve. After the labs are performed.     This note was created using voice recognition software. There may be unintended errors in spelling, grammar or content.

## 2019-04-19 NOTE — ASSESSMENT & PLAN NOTE
In 2010, no symptoms. Will initial aspirin and statin for stroke prevention. Benefits and risks of statin discussed.

## 2019-04-19 NOTE — ASSESSMENT & PLAN NOTE
Difficulty falling asleep has been on zolpidem prn for many years. Has frequent urination during the night.   Sleep hygiene education provided  Willing to try trazodone prn, will not take trazodone and zolpidem together.

## 2019-04-19 NOTE — ASSESSMENT & PLAN NOTE
Follow with rheumatology, Dr. Murillo. On cellcept  Intermittent raynaud's symptoms. Now stable. Rash resolved.

## 2019-04-23 ENCOUNTER — TELEPHONE (OUTPATIENT)
Dept: MEDICAL GROUP | Facility: PHYSICIAN GROUP | Age: 54
End: 2019-04-23

## 2019-04-23 LAB
ALBUMIN SERPL-MCNC: 3.6 G/DL (ref 3.5–5.5)
ALBUMIN/GLOB SERPL: 1.5 {RATIO} (ref 1.2–2.2)
ALP SERPL-CCNC: 49 IU/L (ref 39–117)
ALT SERPL-CCNC: 8 IU/L (ref 0–32)
AST SERPL-CCNC: 9 IU/L (ref 0–40)
BILIRUB SERPL-MCNC: 0.2 MG/DL (ref 0–1.2)
BUN SERPL-MCNC: 14 MG/DL (ref 6–24)
BUN/CREAT SERPL: 16 (ref 9–23)
CALCIUM SERPL-MCNC: 8.9 MG/DL (ref 8.7–10.2)
CHLORIDE SERPL-SCNC: 107 MMOL/L (ref 96–106)
CHOLEST SERPL-MCNC: 134 MG/DL (ref 100–199)
CO2 SERPL-SCNC: 22 MMOL/L (ref 20–29)
CREAT SERPL-MCNC: 0.9 MG/DL (ref 0.57–1)
GLOBULIN SER CALC-MCNC: 2.4 G/DL (ref 1.5–4.5)
GLUCOSE SERPL-MCNC: 73 MG/DL (ref 65–99)
HDLC SERPL-MCNC: 54 MG/DL
LABORATORY COMMENT REPORT: NORMAL
LDLC SERPL CALC-MCNC: 68 MG/DL (ref 0–99)
POTASSIUM SERPL-SCNC: 4.6 MMOL/L (ref 3.5–5.2)
PROT SERPL-MCNC: 6 G/DL (ref 6–8.5)
SODIUM SERPL-SCNC: 141 MMOL/L (ref 134–144)
TRIGL SERPL-MCNC: 58 MG/DL (ref 0–149)
TSH SERPL DL<=0.005 MIU/L-ACNC: 0.67 UIU/ML (ref 0.45–4.5)
VLDLC SERPL CALC-MCNC: 12 MG/DL (ref 5–40)

## 2019-04-23 NOTE — TELEPHONE ENCOUNTER
Phone Number Called: Guerda     Message: [phone]    Left Message for patient to call back: yes    Patient called and stated that she has tried the trazadone for 4 days now and its not working. She would liek to go back to ambien. She has a about a week left of the trazadone left.

## 2019-04-26 ENCOUNTER — OFFICE VISIT (OUTPATIENT)
Dept: MEDICAL GROUP | Facility: PHYSICIAN GROUP | Age: 54
End: 2019-04-26
Payer: COMMERCIAL

## 2019-04-26 VITALS
OXYGEN SATURATION: 98 % | HEART RATE: 73 BPM | DIASTOLIC BLOOD PRESSURE: 76 MMHG | WEIGHT: 154.5 LBS | BODY MASS INDEX: 23.42 KG/M2 | HEIGHT: 68 IN | SYSTOLIC BLOOD PRESSURE: 114 MMHG | RESPIRATION RATE: 14 BRPM | TEMPERATURE: 98.5 F

## 2019-04-26 DIAGNOSIS — R35.1 NOCTURIA: ICD-10-CM

## 2019-04-26 DIAGNOSIS — G47.00 INSOMNIA, UNSPECIFIED TYPE: ICD-10-CM

## 2019-04-26 PROCEDURE — 99214 OFFICE O/P EST MOD 30 MIN: CPT | Performed by: INTERNAL MEDICINE

## 2019-04-26 RX ORDER — AMLODIPINE BESYLATE 2.5 MG/1
2.5 TABLET ORAL
COMMUNITY
End: 2019-05-28 | Stop reason: SDUPTHER

## 2019-04-26 RX ORDER — ZOLPIDEM TARTRATE 10 MG/1
10 TABLET ORAL NIGHTLY PRN
Qty: 30 TAB | Refills: 0 | Status: SHIPPED | OUTPATIENT
Start: 2019-04-26 | End: 2019-05-28 | Stop reason: SDUPTHER

## 2019-04-26 NOTE — PROGRESS NOTES
Established Patient    Guerda Cavazos is a 53 y.o. female who presents today with the following:    CC:   Chief Complaint   Patient presents with   • Insomnia     med review       HPI:     Insomnia, difficulty falling asleep, and maintaining sleep.  Following sleep hygiene, tried trazodone did not work. Has stress from work.  Ambien with 10 mg helped. Tried 5 mg which did not work.  Has not tried CBT-I, will refer patient to psychiatry for CBT-I  She also has nocturia, she will discuss with her gyn. We will also keep voiding diary on weekends. No diabetes, heart failure.     Current Outpatient Prescriptions   Medication Sig Dispense Refill   • amLODIPine (NORVASC) 2.5 MG Tab Take 2.5 mg by mouth every bedtime.     • zolpidem (AMBIEN) 10 MG Tab Take 1 Tab by mouth at bedtime as needed for Sleep for up to 30 days. 30 Tab 0   • Biotin 30281 MCG Tab Take  by mouth.     • Misc Natural Products (COLON CLEANSE) Cap Take  by mouth.     • rosuvastatin (CRESTOR) 5 MG Tab Take 1 Tab by mouth every evening. 30 Tab 11   • aspirin EC (ECOTRIN) 81 MG Tablet Delayed Response Take 1 Tab by mouth every day. 30 Tab    • acetaminophen/caffeine/butalbital 325-40-50 mg (FIORICET) -40 MG Tab Take 1 Tab by mouth every four hours as needed for Headache. 30 Tab 5   • cyclosporin (RESTASIS) 0.05 % ophthalmic emulsion Place 1 Drop in both eyes 2 times a day.     • ondansetron (ZOFRAN ODT) 4 MG TABLET DISPERSIBLE Take 1 Tab by mouth every 8 hours as needed. 30 Tab 3   • mycophenolate (CELLCEPT) 250 MG Cap      • folic acid (FOLVITE) 1 MG Tab        No current facility-administered medications for this visit.        Allergies, past medical history, past surgical history, medications, family history, social history reviewed and updated.    ROS   Constitutional: Denies fevers or chills  Eyes: Denies changes in vision  Ears/Nose/Throat/Mouth: Denies nasal congestion or sore throat   Cardiovascular: Denies chest pain or palpitations  "  Respiratory: Denies shortness of breath , Denies cough  Gastrointestinal/Hepatic: Denies abd pain, nausea, vomiting   Genitourinary: Denies dysuria or frequency  Musculoskeletal/Rheum: Denies joint pain and swelling   Neurological: Denies headache  Psychiatric: insomnia. Denies mood disorder   Endocrine: Denies hx of diabetes or thyroid dysfunction  Heme/Oncology/Lymph Nodes: Denies weight changes or enlarged LNs.    Physical Exam  Vitals: /76 (BP Location: Right arm, Patient Position: Sitting, BP Cuff Size: Adult)   Pulse 73   Temp 36.9 °C (98.5 °F) (Temporal)   Resp 14   Ht 1.715 m (5' 7.5\")   Wt 70.1 kg (154 lb 8 oz)   LMP 02/01/2019 (Within Weeks)   SpO2 98%   BMI 23.84 kg/m²   General: Alert, pleasant, NAD  HEENT: Normocephalic.  EOMI, no icterus or pallor.  Conjunctivae and lids normal. External ears normal. Oropharynx non-erythematous, mucous membranes moist.  Neck supple.  No thyromegaly or masses palpated.   Lymph: No cervical or supraclavicular lymphadenopathy.  Cardiovascular: Regular rate and rhythm.  S1 and S2 normal.  No murmurs appreciated.  Respiratory: Normal respiratory effort.  Clear to auscultation bilaterally.  Abdomen: Non-distended, soft, non-tender  Skin: Warm, dry, no rashes.  Musculoskeletal: Gait is normal.  Moves all extremities well.  Extremities: No leg edema.    Psych:  Affect/mood is normal, judgement is good, memory is intact, grooming is appropriate.      Labs (4/22/19) were reviewed and discussed with patients.  All questions were answered.      Assessment and Plan    Guerda was seen today for insomnia.    Diagnoses and all orders for this visit:    Insomnia, unspecified type  -     REFERRAL TO PSYCHOLOGY for CBT-I  -     zolpidem (AMBIEN) 10 MG Tab; Take 1 Tab by mouth at bedtime as needed for Sleep for up to 30 days.  -     Controlled Substance Treatment Agreement  -     Pain Management Screen; Future        -  If urine drug screen is normal, will prescribe her " "two more months of Ambien.    A Controlled Substance Agreement has been signed within the last year. A urine drug screen has been done in the past year.     The patient reports that  is well controlled with the current treatment plan  They were counseled on other means to help with sleep   This patient is continuing to use a controlled substance (CS) on a long term basis.  The patient is thoroughly aware of the goals of treatment with the CS  The patient is aware that yearly and random urine drug screens are required.  The patient has been instructed to take the CS only as prescribed.  The patient is prohibited from sharing the CS with any other person.  The patient is instructed to inform the provider if any other CS is taken, of any alcohol or cannabis or other recreational drug use, any treatment for side effects of the CS or complications, if they have CS active rx in other states  The patient has evidence for a reason for the CS  The treatment plan has been discussed with the patient  The  report has been reviewed    - Recommend Cognitive Behavioral Therapy for Insomnia, relaxation techniques, mindfulness.     Good sleep hygiene:  --Keep a regular sleep schedule. Going to bed at same time and waking up at same time (even on weekends).   --Go to bed only when sleepy. Sleep only as much as you need to feel rested and then get out of bed  --Bed for sleep and sex only.  Do not read or watch TV in bed.  --Avoid TV, computer/tablet screens for 2 hours prior to bedtime or wear blue light blocking sunglasses. Avoid prolonged use of light-emitting screens before bedtime.  --Dark, cool, quiet bedroom. \"White noise\" (ie from a fan) may be helpful.  --No caffeinated beverages after lunch; do not use alcohol for sleep. Avoid alcohol near bedtime.  --Do not go to bed hungry  -- Do not take a nap during the day.  --Exercise regularly, preferably at least 4 to 5 hours before bedtime  --Make the bedroom environment conducive " to sleep  --Can try Valerian root or melatonin, which are both over the counter.  -- Avoid smoking, especially in the evening  --Deal with your worries before bedtime  -- Get out of bed if unable to fall asleep within 20 minutes and go to another room. Return to bed only when sleepy. Repeat this step as many times as necessary throughout the night.  Stimulus control therapy rule    Relaxation -- Relaxation therapy involves progressively relaxing your muscles from your head down to your feet. Here is a sample of a relaxation program: Beginning with the muscles in your face, squeeze (contract) your muscles gently for one to two seconds and then relax. Repeat several times. Use the same technique for other muscle groups, usually in the following sequence: jaw and neck, shoulders, upper arms, lower arms, fingers, chest, abdomen, buttocks, thighs, calves, and feet. Repeat this cycle for 45 minutes, if necessary. This relaxation program can promote restfulness and sleep.    Nocturia  Keep voiding diary and bring next visit  Follow up with GYN    Follow-up:Return in about 3 months (around 7/26/2019).    This note was created using voice recognition software. There may be unintended errors in spelling, grammar or content.

## 2019-04-26 NOTE — PATIENT INSTRUCTIONS
"- Recommend Cognitive Behavioral Therapy for Insomnia, relaxation techniques, mindfulness.     - Pharmacotherapy:   Trazodone 25-50mg QHS (up to 200mg) or doxepin 3-6mg QHS  Zolpidem ER max 5 mg QHS in woman.     - Referral to sleep medicine    Good sleep hygiene:  --Keep a regular sleep schedule. Going to bed at same time and waking up at same time (even on weekends).   --Sleep only as much as you need to feel rested and then get out of bed  --Bed for sleep and sex only.  Do not read or watch TV in bed.  --Avoid TV, computer/tablet screens for 2 hours prior to bedtime or wear blue light blocking sunglasses. Avoid prolonged use of light-emitting screens before bedtime.  --Dark, cool, quiet bedroom. \"White noise\" (ie from a fan) may be helpful.  --No caffeinated beverages after lunch; do not use alcohol for sleep. Avoid alcohol near bedtime.  --Do not go to bed hungry  --Exercise regularly, preferably at least 4 to 5 hours before bedtime  --Make the bedroom environment conducive to sleep  --Can try Valerian root or melatonin, which are both over the counter.  -- Avoid smoking, especially in the evening  --Deal with your worries before bedtime    Stimulus control therapy rules  -- Go to bed only when sleepy.   -- Do not watch television, read, eat, or worry while in bed. Use bed only for sleep and sex.   -- Get out of bed if unable to fall asleep within 20 minutes and go to another room. Return to bed only when sleepy. Repeat this step as many times as necessary throughout the night.   -- Set an alarm clock to wake up at a fixed time each morning, including weekends.   -- Do not take a nap during the day.     Relaxation -- Relaxation therapy involves progressively relaxing your muscles from your head down to your feet. Here is a sample of a relaxation program: Beginning with the muscles in your face, squeeze (contract) your muscles gently for one to two seconds and then relax. Repeat several times. Use the same " technique for other muscle groups, usually in the following sequence: jaw and neck, shoulders, upper arms, lower arms, fingers, chest, abdomen, buttocks, thighs, calves, and feet. Repeat this cycle for 45 minutes, if necessary. This relaxation program can promote restfulness and sleep.

## 2019-04-30 ENCOUNTER — TELEPHONE (OUTPATIENT)
Dept: NEUROLOGY | Facility: MEDICAL CENTER | Age: 54
End: 2019-04-30

## 2019-04-30 NOTE — TELEPHONE ENCOUNTER
Called the Patient to advise of Hazel note below. I offered to get Patient re-scheduled with another provider. Patient was very irritated that Nan would not just fill the Aimovig for her. She asked for a Medical Record's release form which I told the patient I would put in the mail to her today, which I did. I got her re-scheduled with Dr. Bird. Patient stated that she will be filing a formal complaint with [facility]. She is very dissatisfied with the care she has received here.       ----- Message from Nan Morales sent at 4/25/2019  9:43 PM PDT -----  Regarding: FW: Aimovig injections  Transfer her care to someone who can follow up.    i'm not comfortable initiating a new treatment for her when i'm leaving.    ----- Message -----  From: Gareth Perez Ass't  Sent: 4/25/2019   2:36 PM  To: Nan Morales  Subject: Aimovig injections                               Good afternoon,    This patient is dead set about not coming in to discuss the Aimovig with you. She states you gave her a handout and reviewed all she needs to know about this when she was here last.  She doesn't feel she needs an appointment for additional conversation on this.    Your thoughts

## 2019-05-13 DIAGNOSIS — Z86.73 HX OF TRANSIENT ISCHEMIC ATTACK (TIA): ICD-10-CM

## 2019-05-13 RX ORDER — ROSUVASTATIN CALCIUM 5 MG/1
5 TABLET, COATED ORAL EVERY EVENING
Qty: 90 TAB | Refills: 1 | Status: SHIPPED | OUTPATIENT
Start: 2019-05-13 | End: 2019-11-19 | Stop reason: SDUPTHER

## 2019-05-28 DIAGNOSIS — G47.00 INSOMNIA, UNSPECIFIED TYPE: ICD-10-CM

## 2019-05-28 RX ORDER — ZOLPIDEM TARTRATE 10 MG/1
10 TABLET ORAL NIGHTLY PRN
Qty: 30 TAB | Refills: 0 | Status: SHIPPED | OUTPATIENT
Start: 2019-05-28 | End: 2019-05-28

## 2019-05-28 RX ORDER — ZOLPIDEM TARTRATE 10 MG/1
10 TABLET ORAL NIGHTLY PRN
Qty: 30 TAB | Refills: 0 | Status: SHIPPED | OUTPATIENT
Start: 2019-06-28 | End: 2019-05-28

## 2019-05-28 RX ORDER — ZOLPIDEM TARTRATE 10 MG/1
10 TABLET ORAL NIGHTLY PRN
Qty: 30 TAB | Refills: 0 | Status: SHIPPED | OUTPATIENT
Start: 2019-06-06 | End: 2019-07-06

## 2019-05-28 RX ORDER — ZOLPIDEM TARTRATE 10 MG/1
10 TABLET ORAL NIGHTLY PRN
Qty: 30 TAB | Refills: 0 | Status: SHIPPED | OUTPATIENT
Start: 2019-07-06 | End: 2019-07-15 | Stop reason: SDUPTHER

## 2019-06-04 ENCOUNTER — OFFICE VISIT (OUTPATIENT)
Dept: BEHAVIORAL HEALTH | Facility: CLINIC | Age: 54
End: 2019-06-04
Payer: COMMERCIAL

## 2019-06-04 ENCOUNTER — APPOINTMENT (OUTPATIENT)
Dept: BEHAVIORAL HEALTH | Facility: CLINIC | Age: 54
End: 2019-06-04
Payer: COMMERCIAL

## 2019-06-04 DIAGNOSIS — G47.00 INSOMNIA, UNSPECIFIED TYPE: ICD-10-CM

## 2019-06-04 PROCEDURE — 90791 PSYCH DIAGNOSTIC EVALUATION: CPT | Performed by: SOCIAL WORKER

## 2019-06-04 ASSESSMENT — PATIENT HEALTH QUESTIONNAIRE - PHQ9: CLINICAL INTERPRETATION OF PHQ2 SCORE: 0

## 2019-06-04 NOTE — BH THERAPY
"[facility]  INITIAL ASSESSMENT    Name: Guerda Cavazos  MRN: [id number]  : 1965  Age: 53 y.o.  Date of assessment: 2019  PCP: Prince Hamilton M.D.  Persons in attendance: Patient  Total session time: 45 minutes      CHIEF COMPLAINT AND HISTORY OF PRESENTING PROBLEM:  (as stated by Patient):  Guerda Cavazos is a 53 y.o., Black or  female referred for assessment by No ref. provider found.  Primary presenting issue includes   Chief Complaint   Patient presents with   • Initial  Evaluation   Client reported she was told by her PCP that she needed to have an intake with behavioral health to continue her Ambien prescription. \"I've been on zolpidem for years. But here I am.\"     FAMILY/SOCIAL HISTORY  Current living situation/household members: Client lives with a roommate, but spends a lot of time with her boyfriend at his house.  Relevant family history/structure/dynamics: Client reported she is one of three siblings. Her mom  and  several times. She has one adult daughter, who lives in [place]. Good relationships with family.  Current family/social stressors: None reported.  Quality/quantity of current family and/or social support: Good.  Does patient/parent report a family history of behavioral health issues, diagnoses, or treatment? No       BEHAVIORAL HEALTH TREATMENT HISTORY  Does patient/parent report a history of prior behavioral health treatment for patient? No:    History of untreated behavioral health issues identified? No    MEDICAL HISTORY  Primary care behavioral health screenings: Patient Health Questionaire           Depression Screen (PHQ-2/PHQ-9) 2017   PHQ-2 Total Score 0 0 0 0        If depressive symptoms identified deferred to follow up visit unless specifically addressed in assesment and plan.    Interpretation of PHQ-9 Total Score   Score Severity   1-4 No Depression   5-9 Mild Depression "   10-14 Moderate Depression   15-19 Moderately Severe Depression   20-27 Severe Depression       Past medical/surgical history:   Past Medical History:   Diagnosis Date   • Bowel habit changes     constipation   • Connective tissue disease, undifferentiated ([facility]) 2017    Dr. Murillo   • Migraine without aura and without status migrainosus, not intractable 3/16/2017    Dr. Ponce, Neuroophthalmology; neurology   • Pain     cervical disc disease, arms, leg mostly right side   • Stroke ([facility]) 2010     2 TIAs in 2010, all symptoms resolved      Past Surgical History:   Procedure Laterality Date   • CERVICAL DISK AND FUSION ANTERIOR N/A 4/5/2016    Procedure: CERVICAL DISK AND FUSION ANTERIOR C4-5 WITH C5-7 PLATE REMOVAL ;  Surgeon: Chuck Kate M.D.;  Location: SURGERY [facility];  Service:    • OTHER NEUROLOGICAL SURG  12/15    Radio Frequency Ablations on neck   • OTHER NEUROLOGICAL SURG  03/30/2015    anterior cervical fusion   • GYN SURGERY  2010    fibroids, endometriosis   • ABDOMINAL EXPLORATION      umbilical hernia infant        Medication Allergies:  Other food; Coconut fatty acids; and Peanut-derived   Medical history provided by patient during current evaluation: Connective tissue disease, auto-immune issues, currently experiencing Raynaud's disease       Patient reports last physical exam: 2019  Does patient/parent report any history of or current developmental concerns? No  Does patient/parent report nutritional concerns? No  Does patient/parent report change in appetite or weight loss/gain? No  Does patient/parent report history of eating disorder symptoms? No  Does patient/parent report dental problem? No  Does patient/parent report physical pain? Yes   Indicate if pain is acute or chronic, and location: chronic, varies   Pain scale rating:       Does patient/parent report functional impact of medical, developmental, or pain issues?   yes    EDUCATIONAL/LEARNING HISTORY  Is patient currently  enrolled in a school/educational program?   No:   Highest grade level completed: Some college  School performance/functioning: Average      EMPLOYMENT/RESOURCES  Is the patient currently employed? Yes  Does the patient/parent report adequate financial resources? Yes  Does patient identify impact of presenting issue on work functioning? No  Work or income-related stressors:  n/a     HISTORY:  Does patient report current or past enlistment? Yes     SPIRITUAL/CULTURAL/IDENTITY:  What are the patient’s/family’s spiritual beliefs or practices? Evangelical  What is the patient’s cultural or ethnic background/identity? Black  How does the patient identify their sexual orientation? Straight  How does the patient identify their gender? Female  Does the patient identify any spiritual/cultural/identity factors as relevant to the presenting issue? No    LEGAL HISTORY  Has the patient ever been involved with juvenile, adult, or family legal systems? Denied    ABUSE/NEGLECT/TRAUMA SCREENING  Does patient report feeling “unsafe” in his/her home, or afraid of anyone? No  Does patient report any history of physical, sexual, or emotional abuse? No  Is there evidence of neglect by self? No  Is there evidence of neglect by a caregiver? No  Does the patient/parent report any history of CPS/APS/police involvement related to suspected abuse/neglect or domestic violence? No  Does the patient/parent report any other history of potentially traumatic life events? No  Based on the information provided during the current assessment, is a mandated report of suspected abuse/neglect being made?  No     SAFETY ASSESSMENT - SELF  Does patient acknowledge current or past symptoms of dangerousness to self? No  Recent change in frequency/specificity/intensity of suicidal thoughts or self-harm behavior? No  Current access to firearms, medications, or other identified means of suicide/self-harm? Yes  If yes, willing to restrict access to means of  suicide/self-harm? Yes  Protective factors present: Future-oriented, Good impulse control, Hopefulness, Optimism, Positive coping skills, Positive self-efficacy, Spiritual beliefs/practices, Strong family connections and Strong socia/community connections    Current Suicide Risk: Not applicable  Crisis Safety Plan completed and copy given to patient: No    SAFETY ASSESSMENT - OTHERS  Does paor past symptoms of aggressive behavior or risk to others? No  Recent change in frequency/specificity/intensity of thoughts or threats to harm others? No  Current access to firearms/other identified means of harm? Yes  If yes, willing to restrict access to weapons/means of harm? Yes  Protective factors present: Good frustration tolerance, Moral/spiritual prohibition, Well-developed sense of empathy, Positive impulse-control, Stable relationships, Stable employment and Low rumination/obsession    Current Homicide Risk:  Not applicable  Crisis Safety Plan completed and copy given to patient? No  Based on information provided during the current assessment, is a mandated “duty to warn” being exercised? No    SUBSTANCE USE/ADDICTION HISTORY  [] Not applicable - patient 10 years of age or younger    Is there a family history of substance use/addiction? None reported  Does patient acknowledge or parent/significant other report use of/dependence on substances? No  Last time patient used alcohol: rare  Within the past week? No  Last time patient used marijuana: denied  Within the past month? No  Any other street drugs ever tried even once? No  Any use of prescription medications/pills without a prescription, or for reasons others than originally prescribed?  No  Any other addictive behavior reported (gambling, shopping, sex)? No     What consequences does the patient associate with any of the above substance use and or addictive behaviors? None    Patient’s motivation/readiness for change: n/a    [] Patient denies use of any  substance/addictive behaviors    STRENGTHS/ASSETS  Strengths Identified by interviewer: Evidence of good judgement, Self-awareness, Family suppport, Social support, Stable relationships, Optimism, Problem-solving skills, Sense of humor, Cognitive flexibility and Social skills  Strengths Identified by patient: Stable job, stable mood, close family relationships    MENTAL STATUS/OBSERVATIONS   Participation: Active verbal participation, Attentive and Engaged  Grooming: Good and Neat  Orientation:Alert and Fully Oriented   Behavior: Calm  Eye contact: Good   Mood:Euthymic  Affect:Congruent with content  Thought process: Logical and Goal-directed  Thought content:  Within normal limits  Speech: Rate within normal limits and Volume within normal limits  Perception: Within normal limits  Memory: No gross evidence of memory deficits  Insight: Good  Judgment:  Good  Other:    Family/couple interaction observations: n/a    RESULTS OF SCREENING MEASURES:  [x] Not applicable  Measure:   Score:     Measure:   Score:       CLINICAL FORMULATION: Client, Guerda Cavazos, presented for an initial behavioral health evaluation at the request of her PCP. She reported she has been taking zolpidem for several years due to insomnia that started about 7 years ago. She transferred her care to a new PCP, and he requested she have an evaluation. Client denied symptoms of depression, anxiety, or any history of trauma. No mood instability noted. Client denied any unusual perception changes. She struggles to sleep, and reported she gets up sometimes as often as 6 times a night to use the bathroom. She uses the zolpidem to help get back to sleep. She denied interest in therapy, and reported that her life is pretty good. She has some mood swings due to menopause, but nothing unusual or causing impairment to daily functioning. Clinician suggested client keep the number to the clinic, and schedule a follow up if she noticed any mood disturbance  due to her sleep issues.       DIAGNOSTIC IMPRESSION(S):  1. Insomnia, unspecified type          IDENTIFIED NEEDS/PLAN:  [If any of these marked, trigger DISPOSITION list]  Biomedical  Client can make an appointment within the next year if she decides to follow up    Does patient express agreement with the above plan? Yes     Referral appointment(s) scheduled? No       Mi Maldonado L.C.S.W.

## 2019-07-15 ENCOUNTER — OFFICE VISIT (OUTPATIENT)
Dept: MEDICAL GROUP | Facility: PHYSICIAN GROUP | Age: 54
End: 2019-07-15
Payer: COMMERCIAL

## 2019-07-15 VITALS
HEART RATE: 79 BPM | HEIGHT: 68 IN | DIASTOLIC BLOOD PRESSURE: 76 MMHG | WEIGHT: 156.7 LBS | TEMPERATURE: 98.1 F | RESPIRATION RATE: 14 BRPM | OXYGEN SATURATION: 100 % | SYSTOLIC BLOOD PRESSURE: 98 MMHG | BODY MASS INDEX: 23.75 KG/M2

## 2019-07-15 DIAGNOSIS — G47.00 INSOMNIA, UNSPECIFIED TYPE: ICD-10-CM

## 2019-07-15 DIAGNOSIS — M35.9 CONNECTIVE TISSUE DISEASE, UNDIFFERENTIATED (HCC): ICD-10-CM

## 2019-07-15 DIAGNOSIS — Z86.73 HX OF TRANSIENT ISCHEMIC ATTACK (TIA): ICD-10-CM

## 2019-07-15 PROCEDURE — 99214 OFFICE O/P EST MOD 30 MIN: CPT | Performed by: INTERNAL MEDICINE

## 2019-07-15 RX ORDER — ZOLPIDEM TARTRATE 10 MG/1
10 TABLET ORAL NIGHTLY PRN
Qty: 30 TAB | Refills: 0 | Status: SHIPPED | OUTPATIENT
Start: 2019-08-06 | End: 2019-09-05

## 2019-07-15 RX ORDER — ZOLPIDEM TARTRATE 10 MG/1
10 TABLET ORAL NIGHTLY PRN
Qty: 30 TAB | Refills: 0 | Status: SHIPPED | OUTPATIENT
Start: 2019-09-06 | End: 2019-10-06

## 2019-07-15 RX ORDER — ZOLPIDEM TARTRATE 10 MG/1
10 TABLET ORAL NIGHTLY PRN
Qty: 30 TAB | Refills: 0 | Status: SHIPPED | OUTPATIENT
Start: 2019-10-06 | End: 2019-10-21

## 2019-07-16 NOTE — PROGRESS NOTES
Established Patient    Guerda Cavazos is a 53 y.o. female who presents today with the following:    CC:   Chief Complaint   Patient presents with   • Follow-Up     3 month fv       HPI:     Hx of transient ischemic attack (TIA)  Hx of TIA in 2010, currently on aspirin and statin. Currently asymptomatic.      Insomnia  Stable on Ambien nightly prn  Trouble with falling asleep  Trazodone did not work.  Sleep hygeine    Connective tissue disease, undifferentiated (HCC)  Follow with rheumatology, Dr.Susan Murillo.  Intermittent raynaud's symptoms. Cellcept   and nifedipine.         Current Outpatient Prescriptions   Medication Sig Dispense Refill   • [START ON 8/6/2019] zolpidem (AMBIEN) 10 MG Tab Take 1 Tab by mouth at bedtime as needed for Sleep for up to 30 days. 30 Tab 0   • [START ON 9/6/2019] zolpidem (AMBIEN) 10 MG Tab Take 1 Tab by mouth at bedtime as needed for Sleep for up to 30 days. 30 Tab 0   • [START ON 10/6/2019] zolpidem (AMBIEN) 10 MG Tab Take 1 Tab by mouth at bedtime as needed for Sleep for up to 30 days. 30 Tab 0   • Erenumab (AIMOVIG 140 DOSE) 70 MG/ML Solution Auto-injector Inject 70 mg as instructed Once.     • rosuvastatin (CRESTOR) 5 MG Tab Take 1 Tab by mouth every evening. 90 Tab 1   • Biotin 40511 MCG Tab Take  by mouth.     • aspirin EC (ECOTRIN) 81 MG Tablet Delayed Response Take 1 Tab by mouth every day. 30 Tab    • cyclosporin (RESTASIS) 0.05 % ophthalmic emulsion Place 1 Drop in both eyes 2 times a day.     • mycophenolate (CELLCEPT) 250 MG Cap      • folic acid (FOLVITE) 1 MG Tab      • Misc Natural Products (COLON CLEANSE) Cap Take  by mouth.     • ondansetron (ZOFRAN ODT) 4 MG TABLET DISPERSIBLE Take 1 Tab by mouth every 8 hours as needed. 30 Tab 3     No current facility-administered medications for this visit.        Allergies, past medical history, past surgical history, medications, family history, social history reviewed and updated.    ROS   Constitutional: Denies fevers or  "chills  Eyes: Denies changes in vision  Ears/Nose/Throat/Mouth: Denies nasal congestion or sore throat   Cardiovascular: Denies chest pain or palpitations   Respiratory: Denies shortness of breath , Denies cough  Gastrointestinal/Hepatic: Denies abd pain, nausea, vomiting   Genitourinary: Denies dysuria or frequency  Musculoskeletal/Rheum: Raynaud's Denies joint pain and swelling   Neurological: Denies headache  Psychiatric: Denies mood disorder   Endocrine: Denies hx of diabetes or thyroid dysfunction  Heme/Oncology/Lymph Nodes: Denies weight changes or enlarged LNs.    Physical Exam  Vitals: BP (!) 98/76 (BP Location: Right arm, Patient Position: Sitting, BP Cuff Size: Adult)   Pulse 79   Temp 36.7 °C (98.1 °F) (Temporal)   Resp 14   Ht 1.715 m (5' 7.5\")   Wt 71.1 kg (156 lb 11.2 oz)   LMP  (LMP Unknown)   SpO2 100%   BMI 24.18 kg/m²   General: Alert, pleasant, NAD  HEENT: Normocephalic.  EOMI, no icterus or pallor.  Conjunctivae and lids normal. External ears normal. Oropharynx non-erythematous, mucous membranes moist.  Neck supple.  No thyromegaly or masses palpated.   Lymph: No cervical or supraclavicular lymphadenopathy.  Cardiovascular: Regular rate and rhythm.   Respiratory: Normal respiratory effort.  Clear to auscultation bilaterally.  Abdomen: Non-distended, soft  Skin: Warm, dry, minor small rash on her arms  Musculoskeletal: Gait is normal.  Moves all extremities well.  Extremities: No leg edema.   Psych:  Affect/mood is normal, judgement is good, memory is intact, grooming is appropriate      Assessment and Plan    Guerda was seen today for follow-up.    Diagnoses and all orders for this visit:    Insomnia, unspecified type  -     zolpidem (AMBIEN) 10 MG Tab; Take 1 Tab by mouth at bedtime as needed for Sleep for up to 30 days. 8/6/2019 to 9/5/2019  -     zolpidem (AMBIEN) 10 MG Tab; Take 1 Tab by mouth at bedtime as needed for Sleep for up to 30 days.  9/6/2019 to 10/6/2019  -     zolpidem " (AMBIEN) 10 MG Tab; Take 1 Tab by mouth at bedtime as needed for Sleep for up to 30 days.  10/6/1219 to 11/5/2019  Sleep hygiene    A Controlled Substance Agreement has been signed within the last year. A urine drug screen has been done in the past year.     The patient reports that insomnia is well controlled with the current treatment plan  She was counseled on other means to help with sleep   This patient is continuing to use a controlled substance (CS) on a long term basis.  The patient is thoroughly aware of the goals of treatment with the CS  The patient is aware that yearly and random urine drug screens are required.  The patient has been instructed to take the CS only as prescribed.  The patient is prohibited from sharing the CS with any other person.  The patient is instructed to inform the provider if any other CS is taken, of any alcohol or cannabis or other recreational drug use, any treatment for side effects of the CS or complications, if they have CS active rx in other states  The patient has evidence for a reason for the CS  The treatment plan has been discussed with the patient  The  report has been reviewed      Connective tissue disease, undifferentiated (HCC)  Follow with Dr. Murillo     Hx of transient ischemic attack (TIA)  Aspirin, statin    Follow-up:Return in about 3 months (around 10/15/2019).    This note was created using voice recognition software. There may be unintended errors in spelling, grammar or content.

## 2019-07-16 NOTE — ASSESSMENT & PLAN NOTE
Follow with rheumatology, Dr.Susan Murillo.  Intermittent raynaud's symptoms. Cellcept   and nifedipine.

## 2019-07-17 RX ORDER — NIFEDIPINE 30 MG
TABLET, EXTENDED RELEASE ORAL
COMMUNITY
Start: 2019-07-09 | End: 2021-04-06

## 2019-10-04 ENCOUNTER — OFFICE VISIT (OUTPATIENT)
Dept: MEDICAL GROUP | Facility: PHYSICIAN GROUP | Age: 54
End: 2019-10-04
Payer: COMMERCIAL

## 2019-10-04 VITALS
WEIGHT: 158.2 LBS | SYSTOLIC BLOOD PRESSURE: 106 MMHG | BODY MASS INDEX: 23.98 KG/M2 | HEART RATE: 73 BPM | OXYGEN SATURATION: 99 % | DIASTOLIC BLOOD PRESSURE: 66 MMHG | TEMPERATURE: 98.6 F | RESPIRATION RATE: 14 BRPM | HEIGHT: 68 IN

## 2019-10-04 DIAGNOSIS — F41.9 ANXIETY: ICD-10-CM

## 2019-10-04 DIAGNOSIS — F32.A MILD DEPRESSION: ICD-10-CM

## 2019-10-04 PROCEDURE — 99214 OFFICE O/P EST MOD 30 MIN: CPT | Performed by: INTERNAL MEDICINE

## 2019-10-04 NOTE — PROGRESS NOTES
Established Patient    Guerda Cavazos is a 53 y.o. female who presents today with the following:    CC:   Chief Complaint   Patient presents with   • Follow-Up     mood swings x 4 weeks       HPI:     Feeling depressed and anxious, easily irritable for 4 weeks. LMP 14 months ago.   No SI/HI  PHQ-9 Screening:  (Depression screen)    Over the last 2 weeks, how often have you been bothered by any of the following problems?    1.  Little interest or pleasure in doing things? More than half the days (2)   2.  Feeling down, depressed, or hopeless? Several days (1)  3. Trouble falling or staying asleep, or sleeping too much? Not at all (0)  4. Feeling tired or having little energy? Several days (1)  5. Poor appetite or overeating? Not at all (0)  6. Feeling bad about yourself -- or that you are a failure or have let yourself or your      family down? Not at all (0)  7. Trouble concentrating on things, such as reading the newspaper or watching     Television? Several days (1)  8.  Moving or speaking so slowly that other people could have noticed? Or so fidgety or restless that you have been moving a lot more than usual? Not at all (0)   9.Thoughts that you would be better off dead, or thoughts of hurting yourself in some way? Not at all (0)    If any of the above were scored more than “Not at all”:    How difficult have these problems made it for you to do your work, take care of things at home, or get along with other people? Somewhat difficult (1)    PHQ-9 Score: 6/27    Score Depression severity Recommendations  0-4 Minimal or none Monitor; may not require treatment  5-9 Mild   Use clinical judgment (symptom duration, functional          impairment) to determine necessity of treatment  10-14 Moderate                      15-19 Moderately severe Warrants active treatment with psychotherapy,              20-27 Severe                        medications, or combination        NORMA-7 Screening:  (anxiety screen)    Over the  last 2 weeks, how often have you been bothered by any of the following problems?    Not at all (0)  Several days (+1)  More than half the days (+2)  Nearly every day (+3)    1. Feeling nervous, anxious, or on edge? Several days (1)    2. Not being able to stop or control worrying? Several days (1)    3. Worrying too much about different things? Nearly every day (3)    4. Trouble relaxing? Not at all (0)     5. Being so restless that it's hard to sit still? Not at all (0)    6. Becoming easily annoyed or irritable? Nearly every day (3)    7. Feeling afraid as if something awful might happen? Not at all (0)      If any of the above were scored more than “Not at all”:    How difficult have these problems made it for you to do your work, take care of things at home, or get along with other people? Somewhat difficult      Score:  Symptom     Severity  0-4    Minimal or non     Monitor; may not require treatment  5-9  Mild             Monitor                                                                                   10-14  Moderate    Possible clinically significant condition; referral to mental            health professional recomended  >15  Severe        Active treatment probably warranted      GAD7 score: 8/21      No problem-specific Assessment & Plan notes found for this encounter.      Current Outpatient Medications   Medication Sig Dispense Refill   • NIFEdipine (ADALAT CC) 30 MG CR tablet      • zolpidem (AMBIEN) 10 MG Tab Take 1 Tab by mouth at bedtime as needed for Sleep for up to 30 days. 30 Tab 0   • [START ON 10/6/2019] zolpidem (AMBIEN) 10 MG Tab Take 1 Tab by mouth at bedtime as needed for Sleep for up to 30 days. 30 Tab 0   • Erenumab (AIMOVIG 140 DOSE) 70 MG/ML Solution Auto-injector Inject 70 mg as instructed Once.     • rosuvastatin (CRESTOR) 5 MG Tab Take 1 Tab by mouth every evening. 90 Tab 1   • Biotin 09805 MCG Tab Take  by mouth.     • Misc Natural Products (COLON CLEANSE) Cap Take  by  "mouth.     • aspirin EC (ECOTRIN) 81 MG Tablet Delayed Response Take 1 Tab by mouth every day. 30 Tab    • cyclosporin (RESTASIS) 0.05 % ophthalmic emulsion Place 1 Drop in both eyes 2 times a day.     • ondansetron (ZOFRAN ODT) 4 MG TABLET DISPERSIBLE Take 1 Tab by mouth every 8 hours as needed. 30 Tab 3   • mycophenolate (CELLCEPT) 250 MG Cap      • folic acid (FOLVITE) 1 MG Tab        No current facility-administered medications for this visit.        Allergies, past medical history, past surgical history, medications, family history, social history reviewed and updated.    ROS   Constitutional: Denies fevers or chills  Eyes: Denies changes in vision  Ears/Nose/Throat/Mouth: Denies nasal congestion or sore throat   Cardiovascular: Denies chest pain or palpitations   Respiratory: Denies shortness of breath , Denies cough  Gastrointestinal/Hepatic: Denies abd pain, nausea, vomiting   Genitourinary: Denies dysuria or frequency  Musculoskeletal/Rheum: Denies joint pain and swelling   Neurological: Denies headache  Psychiatric: anxiety, depression  Endocrine: Denies hx of diabetes or thyroid dysfunction  Heme/Oncology/Lymph Nodes: Denies weight changes or enlarged LNs.    Physical Exam  Vitals: /66 (BP Location: Right arm, Patient Position: Sitting, BP Cuff Size: Adult)   Pulse 73   Temp 37 °C (98.6 °F) (Temporal)   Resp 14   Ht 1.715 m (5' 7.5\")   Wt 71.8 kg (158 lb 3.2 oz)   LMP  (LMP Unknown)   SpO2 99%   BMI 24.41 kg/m²   General: Alert, pleasant, NAD  HEENT: Normocephalic.  EOMI, no icterus or pallor.  Conjunctivae and lids normal. External ears normal. Oropharynx non-erythematous, mucous membranes moist.  Neck supple.  No thyromegaly or masses palpated.   Cardiovascular: Regular rate and rhythm.    Respiratory: Normal respiratory effort.  Clear to auscultation bilaterally.  Abdomen: Non-distended, soft  Skin: Warm, dry, no rashes.  Musculoskeletal: Gait is normal.  Moves all extremities " well.  Extremities: No leg edema.   Psych:  Affect/mood is normal, judgement is good, memory is intact, grooming is appropriate.        Assessment and Plan    Guerda was seen today for follow-up.    Diagnoses and all orders for this visit:    Mild depression (HCC)  Anxiety  -     REFERRAL TO PSYCHIATRY  -     TSH WITH REFLEX TO FT4; Future  -     Comp Metabolic Panel; Future  Patient does not want to start medication such as SSRI at this time. She also wants to discuss about this with her GYN. She is interested in psychiatry referral       Follow-up:Return if symptoms worsen or fail to improve.    This note was created using voice recognition software. There may be unintended errors in spelling, grammar or content.

## 2019-10-21 ENCOUNTER — OFFICE VISIT (OUTPATIENT)
Dept: MEDICAL GROUP | Facility: PHYSICIAN GROUP | Age: 54
End: 2019-10-21
Payer: COMMERCIAL

## 2019-10-21 VITALS
WEIGHT: 158.8 LBS | RESPIRATION RATE: 14 BRPM | DIASTOLIC BLOOD PRESSURE: 86 MMHG | SYSTOLIC BLOOD PRESSURE: 112 MMHG | HEIGHT: 68 IN | TEMPERATURE: 99.2 F | HEART RATE: 76 BPM | OXYGEN SATURATION: 98 % | BODY MASS INDEX: 24.07 KG/M2

## 2019-10-21 DIAGNOSIS — Z12.31 ENCOUNTER FOR SCREENING MAMMOGRAM FOR BREAST CANCER: ICD-10-CM

## 2019-10-21 DIAGNOSIS — G47.00 INSOMNIA, UNSPECIFIED TYPE: ICD-10-CM

## 2019-10-21 PROCEDURE — 99214 OFFICE O/P EST MOD 30 MIN: CPT | Performed by: INTERNAL MEDICINE

## 2019-10-21 RX ORDER — ZOLPIDEM TARTRATE 10 MG/1
10 TABLET ORAL NIGHTLY PRN
Qty: 30 TAB | Refills: 0 | Status: SHIPPED | OUTPATIENT
Start: 2019-12-06 | End: 2020-01-05

## 2019-10-21 RX ORDER — ZOLPIDEM TARTRATE 10 MG/1
10 TABLET ORAL NIGHTLY PRN
Qty: 30 TAB | Refills: 0 | Status: SHIPPED | OUTPATIENT
Start: 2019-11-06 | End: 2019-12-06

## 2019-10-21 RX ORDER — ZOLPIDEM TARTRATE 10 MG/1
10 TABLET ORAL NIGHTLY PRN
Qty: 30 TAB | Refills: 0 | Status: SHIPPED | OUTPATIENT
Start: 2020-01-04 | End: 2020-01-07 | Stop reason: SDUPTHER

## 2019-10-21 NOTE — PROGRESS NOTES
Established Patient    Guerda Cavazos is a 53 y.o. female who presents today with the following:    CC:   Chief Complaint   Patient presents with   • Follow-Up     4 month visit       HPI:     Insomnia  Stable on Ambien nightly prn  Trouble with falling asleep  Trazodone did not work.  Sleep hygeine      mammogram scheduled.    Her CMP and TSH unremarkable.  Her mood is fine.    Current Outpatient Medications   Medication Sig Dispense Refill   • [START ON 12/6/2019] zolpidem (AMBIEN) 10 MG Tab Take 1 Tab by mouth at bedtime as needed for Sleep for up to 30 days. 30 Tab 0   • [START ON 11/6/2019] zolpidem (AMBIEN) 10 MG Tab Take 1 Tab by mouth at bedtime as needed for Sleep for up to 30 days. 30 Tab 0   • [START ON 1/4/2020] zolpidem (AMBIEN) 10 MG Tab Take 1 Tab by mouth at bedtime as needed for Sleep for up to 30 days. 30 Tab 0   • NIFEdipine (ADALAT CC) 30 MG CR tablet      • Erenumab (AIMOVIG 140 DOSE) 70 MG/ML Solution Auto-injector Inject 70 mg as instructed Once.     • rosuvastatin (CRESTOR) 5 MG Tab Take 1 Tab by mouth every evening. 90 Tab 1   • Biotin 11167 MCG Tab Take  by mouth.     • Misc Natural Products (COLON CLEANSE) Cap Take  by mouth.     • aspirin EC (ECOTRIN) 81 MG Tablet Delayed Response Take 1 Tab by mouth every day. 30 Tab    • cyclosporin (RESTASIS) 0.05 % ophthalmic emulsion Place 1 Drop in both eyes 2 times a day.     • ondansetron (ZOFRAN ODT) 4 MG TABLET DISPERSIBLE Take 1 Tab by mouth every 8 hours as needed. 30 Tab 3   • mycophenolate (CELLCEPT) 250 MG Cap      • folic acid (FOLVITE) 1 MG Tab        No current facility-administered medications for this visit.        Allergies, past medical history, past surgical history, medications, family history, social history reviewed and updated.    ROS   Constitutional: Denies fevers or chills  Eyes: Denies changes in vision  Ears/Nose/Throat/Mouth: Denies nasal congestion or sore throat   Cardiovascular: Denies chest pain or palpitations  "  Respiratory: Denies shortness of breath , Denies cough  Gastrointestinal/Hepatic: Denies abd pain, nausea, vomiting   Genitourinary: Denies dysuria or frequency  Musculoskeletal/Rheum: Denies joint pain and swelling   Neurological: Denies headache  Psychiatric: mood stable.  Endocrine: Denies hx of diabetes or thyroid dysfunction  Heme/Oncology/Lymph Nodes: Denies weight changes or enlarged LNs.    Physical Exam  Vitals: /86 (BP Location: Left arm, Patient Position: Sitting, BP Cuff Size: Adult)   Pulse 76   Temp 37.3 °C (99.2 °F) (Temporal)   Resp 14   Ht 1.715 m (5' 7.5\")   Wt 72 kg (158 lb 12.8 oz)   LMP  (LMP Unknown)   SpO2 98%   BMI 24.50 kg/m²   General: Alert, pleasant, NAD  HEENT: Normocephalic.  EOMI, no icterus or pallor.  Conjunctivae and lids normal. External ears normal. Oropharynx non-erythematous, mucous membranes moist.  Neck supple.  No thyromegaly or masses palpated.   Lymph: No cervical or supraclavicular lymphadenopathy.  Cardiovascular: Regular rate and rhythm.  S1 and S2 normal.  No murmurs appreciated.  Respiratory: Normal respiratory effort.  Clear to auscultation bilaterally.  Abdomen: Non-distended, soft  Skin: Warm, dry, no rashes.  Musculoskeletal: Gait is normal.  Moves all extremities well.  Extremities: No leg edema.  Pedal pulses 2+ symmetric.   Psych:  Affect/mood is normal, judgement is good, memory is intact, grooming is appropriate.      Labs (10/6/19) were reviewed and discussed with patients.    All questions were answered.      Assessment and Plan    Guerda was seen today for follow-up.    Diagnoses and all orders for this visit:    Insomnia, unspecified type  -     zolpidem (AMBIEN) 10 MG Tab; Take 1 Tab by mouth at bedtime as needed for Sleep for up to 30 days. START ON 12/6/2019  -     zolpidem (AMBIEN) 10 MG Tab; Take 1 Tab by mouth at bedtime as needed for Sleep for up to 30 days. START ON 11/6/2019  -     zolpidem (AMBIEN) 10 MG Tab; Take 1 Tab by " mouth at bedtime as needed for Sleep for up to 30 days. START ON 1/4/2020 (Her pharmacy close on Sunday 1/5/19, so ok to  one day earlier)    A Controlled Substance Agreement has been signed within the last year. A urine drug screen has been done in the past year.        The patient reports that insomnia is well controlled with the current treatment plan  They were counseled on other means to help with sleep   This patient is continuing to use a controlled substance (CS) on a long term basis.  The patient is thoroughly aware of the goals of treatment with the CS  The patient is aware that yearly and random urine drug screens are required.  The patient has been instructed to take the CS only as prescribed.  The patient is prohibited from sharing the CS with any other person.  The patient is instructed to inform the provider if any other CS is taken, of any alcohol or cannabis or other recreational drug use, any treatment for side effects of the CS or complications, if they have CS active rx in other states  The patient has evidence for a reason for the CS  The treatment plan has been discussed with the patient  The  report has been reviewed      Encounter for screening mammogram for breast cancer  -     MA-SCREENING MAMMO BILAT W/TOMOSYNTHESIS W/CAD; Future        Follow-up:Return in about 3 months (around 1/21/2020), or if symptoms worsen or fail to improve.    This note was created using voice recognition software. There may be unintended errors in spelling, grammar or content.

## 2019-10-22 NOTE — ASSESSMENT & PLAN NOTE
Stable on Ambien nightly prn  Trouble with falling asleep  Trazodone did not work.  Sleep hygeine

## 2019-11-21 DIAGNOSIS — Z86.73 HX OF TRANSIENT ISCHEMIC ATTACK (TIA): ICD-10-CM

## 2019-11-21 RX ORDER — ROSUVASTATIN CALCIUM 5 MG/1
5 TABLET, COATED ORAL DAILY
Qty: 30 TAB | Refills: 2 | Status: SHIPPED | OUTPATIENT
Start: 2019-11-21 | End: 2020-07-21 | Stop reason: SDUPTHER

## 2019-11-21 NOTE — TELEPHONE ENCOUNTER
The patient is needing a 2 week fill of this due to [facility] not available and she only has 2 days of medication left.

## 2019-12-05 ENCOUNTER — PATIENT MESSAGE (OUTPATIENT)
Dept: MEDICAL GROUP | Facility: PHYSICIAN GROUP | Age: 54
End: 2019-12-05

## 2019-12-05 NOTE — TELEPHONE ENCOUNTER
Called and discussed with patient.  Checked , the ambien was filled on 11/6/19, today should be the 30th pill for the prescription. However, patient does not have any left. She states she must have lost one pill.  When asking her if she is taking it as prescribed, she states yes.  She also requests to see the regulation for control substance. Verbally guided her to the  Http://medboard.[place].gov/Resources/ControlledSubstancePrescribing/   will also reprint the controlled substance agreement during the next vist

## 2019-12-05 NOTE — TELEPHONE ENCOUNTER
From: Guerda Cavazos  To: Prince Hamilton M.D.  Sent: 12/5/2019 9:43 AM PST  Subject: Prescription Question    You will need to make the refill on my prescriptions for 30 days because you did not calculate properly and I will not have one of my medications tonight. I had no problems prior to coming to see you and you have made this more difficult. If you are not able to do that, I will need the chenge of requirements for you to do this differently in writing at my next appointment.

## 2020-01-07 ENCOUNTER — OFFICE VISIT (OUTPATIENT)
Dept: MEDICAL GROUP | Facility: PHYSICIAN GROUP | Age: 55
End: 2020-01-07
Payer: COMMERCIAL

## 2020-01-07 VITALS
SYSTOLIC BLOOD PRESSURE: 118 MMHG | DIASTOLIC BLOOD PRESSURE: 86 MMHG | BODY MASS INDEX: 24.19 KG/M2 | HEART RATE: 62 BPM | HEIGHT: 68 IN | OXYGEN SATURATION: 96 % | RESPIRATION RATE: 16 BRPM | WEIGHT: 159.6 LBS | TEMPERATURE: 97.8 F

## 2020-01-07 DIAGNOSIS — G47.00 INSOMNIA, UNSPECIFIED TYPE: ICD-10-CM

## 2020-01-07 DIAGNOSIS — J06.9 ACUTE URI: ICD-10-CM

## 2020-01-07 PROCEDURE — 99214 OFFICE O/P EST MOD 30 MIN: CPT | Performed by: INTERNAL MEDICINE

## 2020-01-07 RX ORDER — ZOLPIDEM TARTRATE 10 MG/1
10 TABLET ORAL NIGHTLY PRN
Qty: 30 TAB | Refills: 0 | Status: SHIPPED | OUTPATIENT
Start: 2020-03-04 | End: 2020-01-07 | Stop reason: SDUPTHER

## 2020-01-07 RX ORDER — AMOXICILLIN AND CLAVULANATE POTASSIUM 875; 125 MG/1; MG/1
1 TABLET, FILM COATED ORAL 2 TIMES DAILY
Qty: 20 TAB | Refills: 0 | Status: SHIPPED | OUTPATIENT
Start: 2020-01-07 | End: 2020-01-17

## 2020-01-07 RX ORDER — ZOLPIDEM TARTRATE 10 MG/1
10 TABLET ORAL NIGHTLY PRN
Qty: 30 TAB | Refills: 0 | Status: SHIPPED | OUTPATIENT
Start: 2020-02-03 | End: 2020-01-07 | Stop reason: SDUPTHER

## 2020-01-07 RX ORDER — ZOLPIDEM TARTRATE 10 MG/1
10 TABLET ORAL NIGHTLY PRN
Qty: 30 TAB | Refills: 0 | Status: SHIPPED | OUTPATIENT
Start: 2020-04-03 | End: 2020-04-08 | Stop reason: SDUPTHER

## 2020-01-07 ASSESSMENT — PATIENT HEALTH QUESTIONNAIRE - PHQ9: CLINICAL INTERPRETATION OF PHQ2 SCORE: 0

## 2020-01-07 NOTE — PROGRESS NOTES
Established Patient    Guerda Cavazos is a 54 y.o. female who presents today with the following:    CC:   Chief Complaint   Patient presents with   • Insomnia     med refills, cold x 1 week       HPI:   Runny nose, sinus congestion, earache, fever, T-max of 101, sore throat, cough with sputum, voice change, headache since December 31, 2019 went to urgent care was recommended symptomatic treatment of Flonase, ibuprofen as needed, antihistamine on December 31, 2019. Her fever and myalgia resolved.  However still have the sinus symptoms and sore throat as well as voice changes.    Insomnia  Stable on Ambien nightly prn  Trouble with falling asleep  Trazodone did not work.  Sleep hygeine        Current Outpatient Medications   Medication Sig Dispense Refill   • amoxicillin-clavulanate (AUGMENTIN) 875-125 MG Tab Take 1 Tab by mouth 2 times a day for 10 days. 20 Tab 0   • [START ON 4/3/2020] zolpidem (AMBIEN) 10 MG Tab Take 1 Tab by mouth at bedtime as needed for Sleep for up to 30 days. 30 Tab 0   • rosuvastatin (CRESTOR) 5 MG Tab Take 1 Tab by mouth every day. 30 Tab 2   • NIFEdipine (ADALAT CC) 30 MG CR tablet      • Erenumab (AIMOVIG 140 DOSE) 70 MG/ML Solution Auto-injector Inject 70 mg as instructed Once.     • Biotin 69583 MCG Tab Take  by mouth.     • Misc Natural Products (COLON CLEANSE) Cap Take  by mouth.     • aspirin EC (ECOTRIN) 81 MG Tablet Delayed Response Take 1 Tab by mouth every day. 30 Tab    • cyclosporin (RESTASIS) 0.05 % ophthalmic emulsion Place 1 Drop in both eyes 2 times a day.     • ondansetron (ZOFRAN ODT) 4 MG TABLET DISPERSIBLE Take 1 Tab by mouth every 8 hours as needed. 30 Tab 3   • mycophenolate (CELLCEPT) 250 MG Cap      • folic acid (FOLVITE) 1 MG Tab        No current facility-administered medications for this visit.        Allergies, past medical history, past surgical history, medications, family history, social history reviewed and updated.    ROS   Constitutional: Denies fevers or  "chills  Eyes: Denies changes in vision  Ears/Nose/Throat/Mouth: Per HPI  Cardiovascular: Denies chest pain or palpitations   Respiratory: HPI  Gastrointestinal/Hepatic: Denies abd pain, nausea, vomiting   Genitourinary: Denies dysuria or frequency  Musculoskeletal/Rheum: Denies joint pain and swelling   Neurological: headache  Psychiatric: Sleep okay.  Denies mood disorder   Endocrine: Denies hx of diabetes or thyroid dysfunction  Heme/Oncology/Lymph Nodes: Denies weight changes or enlarged LNs.    Physical Exam  Vitals: /86 (BP Location: Right arm, Patient Position: Sitting, BP Cuff Size: Adult)   Pulse 62   Temp 36.6 °C (97.8 °F) (Temporal)   Resp 16   Ht 1.715 m (5' 7.5\")   Wt 72.4 kg (159 lb 9.6 oz)   LMP  (LMP Unknown)   SpO2 96%   BMI 24.63 kg/m²   General: Alert, pleasant, NAD  HEENT: Normocephalic.  EOMI, no icterus or pallor.  Conjunctivae and lids normal. External ears normal. Oropharynx erythematous, mucous membranes moist.  Neck supple.  No thyromegaly or masses palpated.   Tympanic membranes intact .  Nares patent, mucosa pink.   maxillary sinus tenderness. No pharyngeal exudate.  No tonsillopharyngeal or uvular edema. No mastoid swelling & tenderness.    Lymph: No cervical or supraclavicular lymphadenopathy.  Cardiovascular: Regular rate and rhythm.    Respiratory: Normal respiratory effort.  Clear to auscultation bilaterally.  Abdomen: Non-distended, soft  Skin: Warm, dry, no rashes.  Musculoskeletal: Gait is normal.  Moves all extremities well.  Extremities: No leg edema.    Psych:  Affect/mood is normal, judgement is good, memory is intact, grooming is appropriate.          Assessment and Plan    1. Acute URI  - amoxicillin-clavulanate (AUGMENTIN) 875-125 MG Tab; Take 1 Tab by mouth 2 times a day for 7-10 days.  Dispense: 20 Tab; Refill: 0  --Increase fluid intake, rest.  --Nasal irrigation or a Neti-pot.  --Humidifier in room or hot shower/bath.  --warm salt water gargles  --Tylenol " or nonsteroidal anti-inflammatories  (Advil, Naproxen, ibuprofen) as needed for aches and pain, fever.  -- mucinex prn  --Intranasal steroids (Flonase, prescription or over-the-counter).  --Prevention: Wash hands, cover cough, avoid immunocompromised patients, and stay at home.      2. Insomnia, unspecified type  Stable  - zolpidem (AMBIEN) 10 MG Tab; Take 1 Tab by mouth at bedtime as needed for Sleep for up to 30 days.  Dispense: 30 Tab; Refill: 0 (2/3/2020->)  - zolpidem (AMBIEN) 10 MG Tab; Take 1 Tab by mouth at bedtime as needed for Sleep for up to 30 days.  Dispense: 30 Tab; Refill: 0 (3/4/2020->)  - zolpidem (AMBIEN) 10 MG Tab; Take 1 Tab by mouth at bedtime as needed for Sleep for up to 30 days.  Dispense: 30 Tab; Refill: 0 (4/3/2020->)  A Controlled Substance Agreement has been signed within the last year. A urine drug screen has been done in the past year.        The patient reports that sleep is well controlled with the current treatment plan  They were counseled on other means to help with sleep   This patient is continuing to use a controlled substance (CS) on a long term basis.  The patient is thoroughly aware of the goals of treatment with the CS  The patient is aware that yearly and random urine drug screens are required.  The patient has been instructed to take the CS only as prescribed.  The patient is prohibited from sharing the CS with any other person.  The patient is instructed to inform the provider if any other CS is taken, of any alcohol or cannabis or other recreational drug use, any treatment for side effects of the CS or complications, if they have CS active rx in other states  The patient has evidence for a reason for the CS  The treatment plan has been discussed with the patient  The  report has been reviewed    Follow-up:Return in about 3 months (around 4/7/2020), or if symptoms worsen or fail to improve.    This note was created using voice recognition software. There may be  unintended errors in spelling, grammar or content.

## 2020-01-07 NOTE — ASSESSMENT & PLAN NOTE
Stable on Ambien nightly prn  Trouble with falling asleep  Trazodone did not work.  Sleep hygeine

## 2020-04-08 ENCOUNTER — OFFICE VISIT (OUTPATIENT)
Dept: MEDICAL GROUP | Facility: PHYSICIAN GROUP | Age: 55
End: 2020-04-08
Payer: COMMERCIAL

## 2020-04-08 VITALS
TEMPERATURE: 98.1 F | DIASTOLIC BLOOD PRESSURE: 70 MMHG | WEIGHT: 154 LBS | SYSTOLIC BLOOD PRESSURE: 108 MMHG | BODY MASS INDEX: 24.75 KG/M2 | OXYGEN SATURATION: 96 % | HEIGHT: 66 IN | HEART RATE: 80 BPM | RESPIRATION RATE: 16 BRPM

## 2020-04-08 DIAGNOSIS — G47.00 INSOMNIA, UNSPECIFIED TYPE: ICD-10-CM

## 2020-04-08 DIAGNOSIS — Z79.899 CONTROLLED SUBSTANCE AGREEMENT SIGNED: ICD-10-CM

## 2020-04-08 DIAGNOSIS — Z00.00 ANNUAL PHYSICAL EXAM: ICD-10-CM

## 2020-04-08 DIAGNOSIS — E78.5 DYSLIPIDEMIA: ICD-10-CM

## 2020-04-08 DIAGNOSIS — Z76.89 ESTABLISHING CARE WITH NEW DOCTOR, ENCOUNTER FOR: ICD-10-CM

## 2020-04-08 PROCEDURE — 99214 OFFICE O/P EST MOD 30 MIN: CPT | Performed by: NURSE PRACTITIONER

## 2020-04-08 RX ORDER — ZOLPIDEM TARTRATE 10 MG/1
10 TABLET ORAL NIGHTLY PRN
Qty: 30 TAB | Refills: 0 | Status: SHIPPED | OUTPATIENT
Start: 2020-07-04 | End: 2020-07-21 | Stop reason: SDUPTHER

## 2020-04-08 RX ORDER — ZOLPIDEM TARTRATE 10 MG/1
10 TABLET ORAL NIGHTLY PRN
Qty: 30 TAB | Refills: 0 | Status: SHIPPED | OUTPATIENT
Start: 2020-06-03 | End: 2020-04-08 | Stop reason: SDUPTHER

## 2020-04-08 RX ORDER — ESTRADIOL 0.05 MG/D
0.05 PATCH, EXTENDED RELEASE TRANSDERMAL DAILY
COMMUNITY
Start: 2020-03-30 | End: 2021-06-21

## 2020-04-08 RX ORDER — ZOLPIDEM TARTRATE 10 MG/1
10 TABLET ORAL NIGHTLY PRN
Qty: 30 TAB | Refills: 0 | Status: SHIPPED | OUTPATIENT
Start: 2020-05-04 | End: 2020-04-08 | Stop reason: SDUPTHER

## 2020-04-08 SDOH — HEALTH STABILITY: MENTAL HEALTH: HOW OFTEN DO YOU HAVE 6 OR MORE DRINKS ON ONE OCCASION?: LESS THAN MONTHLY

## 2020-04-08 NOTE — ASSESSMENT & PLAN NOTE
New to me. Chronic issue. Currently taking Crestor 5 mg nightly, denies SEs. Hx of TIAs. Denies CP, palpitations, SOB, lowe extremity swelling, no fam HX of stroke. VSS.

## 2020-04-08 NOTE — PROGRESS NOTES
Chief Complaint   Patient presents with   • Establish Care       HISTORY OF PRESENT ILLNESS: Patient is a 54 y.o. female, established patient who presents today to discuss medical problems as listed below:    Health Maintenance:  COMPLETED.    Insomnia  New to me. Chronic issue. Well controlled on zolpidem 10 mg nightly. Denies SEs. In the past explored multiple options including Trazodone, sleep hygiene and herbal supplements w/out success. Would like a refill today starting 5/3/20, as just picked up her monthly refill a few days ago.    Dyslipidemia  New to me. Chronic issue. Currently taking Crestor 5 mg nightly, denies SEs. Hx of TIAs. Denies CP, palpitations, SOB, lowe extremity swelling, no fam HX of stroke.       Patient Active Problem List    Diagnosis Date Noted   • Establishing care with new doctor, encounter for 04/08/2020   • Dyslipidemia 04/08/2020   • Nocturia 04/26/2019   • Hx of transient ischemic attack (TIA) 04/18/2019   • Insomnia 04/18/2019   • Connective tissue disease, undifferentiated (HCC) 04/18/2019   • Benign familial tremor 03/16/2017   • Ocular proptosis 03/16/2017   • Migraine without aura and without status migrainosus, not intractable 03/16/2017   • Degenerative cervical disc 04/05/2016        Allergies: Other food; Coconut fatty acids; and Peanut-derived    Current Outpatient Medications   Medication Sig Dispense Refill   • CAMPBELL 0.05 MG/24HR PATCH BIWEEKLY 0.05 mg by Apply externally route every day.     • progesterone (PROMETRIUM) 200 MG capsule Take 200 mg by mouth.     • [START ON 7/4/2020] zolpidem (AMBIEN) 10 MG Tab Take 1 Tab by mouth at bedtime as needed for Sleep for up to 30 days. 30 Tab 0   • rosuvastatin (CRESTOR) 5 MG Tab Take 1 Tab by mouth every day. 30 Tab 2   • NIFEdipine (ADALAT CC) 30 MG CR tablet      • Erenumab (AIMOVIG 140 DOSE) 70 MG/ML Solution Auto-injector Inject 70 mg as instructed Once.     • cyclosporin (RESTASIS) 0.05 % ophthalmic emulsion Place 1 Drop in  both eyes 2 times a day.     • ondansetron (ZOFRAN ODT) 4 MG TABLET DISPERSIBLE Take 1 Tab by mouth every 8 hours as needed. 30 Tab 3   • mycophenolate (CELLCEPT) 250 MG Cap      • folic acid (FOLVITE) 1 MG Tab        No current facility-administered medications for this visit.        Social History     Tobacco Use   • Smoking status: Former Smoker     Packs/day: 0.25     Years: 30.00     Pack years: 7.50     Types: Cigarettes     Last attempt to quit: 2014     Years since quittin.0   • Smokeless tobacco: Never Used   Substance Use Topics   • Alcohol use: Yes     Alcohol/week: 0.0 oz     Binge frequency: Less than monthly     Comment: about 1 every other  month   • Drug use: No     Social History     Social History Narrative   • Not on file       Family History   Problem Relation Age of Onset   • Hypertension Mother    • Cancer Mother 50        breast cancer   • Cancer Father 50        Prostate cancer   • Hypertension Brother    • Diabetes Brother    • Heart Disease Paternal Grandfather         MI 70s   • Hypertension Brother        Allergies, past medical history, past surgical history, family history, social history reviewed and updated.    Review of Systems:     - Constitutional: chronic insomnia. Negative for fever, chills, unexpected weight change, and fatigue/generalized weakness.     - HEENT: Negative for headaches, vision changes, hearing changes, ear pain, ear discharge, rhinorrhea, sinus congestion, sore throat, and neck pain.      - Respiratory: Negative for cough, sputum production, chest congestion, dyspnea, wheezing, and crackles.      - Cardiovascular: Negative for chest pain, palpitations, orthopnea, and bilateral lower extremity edema.     - Gastrointestinal: Negative for heartburn, nausea, vomiting, abdominal pain, hematochezia, melena, diarrhea, constipation, and greasy/foul-smelling stools.     - Genitourinary: Negative for dysuria, polyuria, hematuria, pyuria, urinary urgency, and  "urinary incontinence.    - Musculoskeletal: Negative for myalgias, back pain, and joint pain.     - Skin: Negative for rash, itching, cyanotic skin color change.     - Neurological: Negative for dizziness, tingling, tremors, focal sensory deficit, focal weakness and headaches.     - Endo/Heme/Allergies: Does not bruise/bleed easily.     - Psychiatric/Behavioral: Negative for depression, suicidal/homicidal ideation and memory loss.      All other systems reviewed and are negative    Exam:    /70 (BP Location: Right arm, Patient Position: Sitting, BP Cuff Size: Adult)   Pulse 80   Temp 36.7 °C (98.1 °F) (Temporal)   Resp 16   Ht 1.676 m (5' 6\")   Wt 69.9 kg (154 lb)   LMP  (LMP Unknown)   SpO2 96%   BMI 24.86 kg/m²  Body mass index is 24.86 kg/m².    Physical Exam:  Constitutional: Well-developed and well-nourished. Not diaphoretic. No distress.   Skin: Skin is warm and dry. No rash noted.  Head: Atraumatic without lesions.  Eyes: Conjunctivae and extraocular motions are normal. Pupils are equal, round, and reactive to light. No scleral icterus.   Ears:  External ears unremarkable. Tympanic membranes clear and intact.  Nose: Nares patent. Septum midline. Turbinates without erythema nor edema. No discharge.   Mouth/Throat: Dentition is normal. Tongue normal. Oropharynx is clear and moist. Posterior pharynx without erythema or exudates.  Neck: Supple, trachea midline. Normal range of motion. No thyromegaly present. No lymphadenopathy--cervical or supraclavicular.  Cardiovascular: Regular rate and rhythm, S1 and S2 without murmur, rubs, or gallops.    Chest: Effort normal. Clear to auscultation throughout. No adventitious sounds. No CVA tenderness.  Abdomen: Soft, non tender, and without distention. Active bowel sounds in all four quadrants. No rebound, guarding, masses or HSM.  : Negative for dysuria, polyuria, hematuria, pyuria, urinary urgency, and urinary incontinence.  Extremities: No cyanosis, " "clubbing, erythema, nor edema. Distal pulses intact and symmetric.   Musculoskeletal: All major joints AROM full in all directions without pain.  Neurological: Alert and oriented x 3. DTRs 2+/3 and symmetric. No cranial nerve deficit. 5/5 myotomes. Sensation intact. Negative Rhomberg.  Psychiatric:  Behavior, mood, and affect are appropriate.  MA/nursing note and vitals reviewed.    Assessment/Plan:  1. Controlled substance agreement signed  - Controlled Substance Treatment Agreement    2. Annual physical exam  - Lipid Profile; Future  - HEMOGLOBIN A1C; Future  - CBC WITH DIFFERENTIAL; Future  - TSH; Future  - FREE THYROXINE; Future  - VITAMIN D,25 HYDROXY; Future    3. Dyslipidemia  We will review results and discuss findings with patient.  - Lipid Profile; Future    4. Insomnia, unspecified type  Well-controlled on current medication.  Refills given.  Patient understands this prescription is a controlled substance which is potentially habit-forming and its use is regulated by the [employer]. We also discussed the new \"black box\" warning regarding the lethal combination of opioids and benzodiazepines. Refills are subject to terms of a controlled substance agreement and patient has an updated one on file. Any refill requires an office visit. This medicine can cause nausea, significant constipation, sedation, confusion and accidental overdose.  [facility] Query reviewed. Controlled substance agreement signed.  - TSH; Future  - FREE THYROXINE; Future  - zolpidem (AMBIEN) 10 MG Tab; Take 1 Tab by mouth at bedtime as needed for Sleep for up to 30 days.  Dispense: 30 Tab; Refill: 0    5. Establishing care with new doctor, encounter for       Discussed with patient possible alternative diagnoses, pt is to take all medications as prescribed. If symptoms persist FU w/PCP, if symptoms worsen go to emergency room. If experiencing any side effects from prescribed medications reports to the office immediately or go to emergency " room.  Reviewed indication, dosage, usage and potential adverse effects of prescribed medications. Reviewed risks and benefits of treatment plan. Patient verbalizes understanding of all instruction and verbally agrees to plan.    Return if symptoms worsen or fail to improve.

## 2020-04-08 NOTE — ASSESSMENT & PLAN NOTE
New to me. Chronic issue. Well controlled on zolpidem 10 mg nightly. Denies SEs. In the past explored multiple options including Trazodone, sleep hygiene and herbal supplements w/out success. Would like a refill today starting 5/3/20, as just picked up her monthly refill a few days ago.

## 2020-06-10 ENCOUNTER — HOSPITAL ENCOUNTER (OUTPATIENT)
Dept: LAB | Facility: MEDICAL CENTER | Age: 55
End: 2020-06-10
Attending: NURSE PRACTITIONER
Payer: COMMERCIAL

## 2020-06-10 DIAGNOSIS — G47.00 INSOMNIA, UNSPECIFIED TYPE: ICD-10-CM

## 2020-06-10 DIAGNOSIS — F32.A MILD DEPRESSION: ICD-10-CM

## 2020-06-10 DIAGNOSIS — E78.5 DYSLIPIDEMIA: ICD-10-CM

## 2020-06-10 DIAGNOSIS — F41.9 ANXIETY: ICD-10-CM

## 2020-06-10 DIAGNOSIS — Z00.00 ANNUAL PHYSICAL EXAM: ICD-10-CM

## 2020-06-10 LAB
BASOPHILS # BLD AUTO: 1.2 % (ref 0–1.8)
BASOPHILS # BLD: 0.09 K/UL (ref 0–0.12)
EOSINOPHIL # BLD AUTO: 0.21 K/UL (ref 0–0.51)
EOSINOPHIL NFR BLD: 2.8 % (ref 0–6.9)
ERYTHROCYTE [DISTWIDTH] IN BLOOD BY AUTOMATED COUNT: 43.7 FL (ref 35.9–50)
EST. AVERAGE GLUCOSE BLD GHB EST-MCNC: 105 MG/DL
HBA1C MFR BLD: 5.3 % (ref 0–5.6)
HCT VFR BLD AUTO: 46.3 % (ref 37–47)
HGB BLD-MCNC: 15.1 G/DL (ref 12–16)
IMM GRANULOCYTES # BLD AUTO: 0.01 K/UL (ref 0–0.11)
IMM GRANULOCYTES NFR BLD AUTO: 0.1 % (ref 0–0.9)
LYMPHOCYTES # BLD AUTO: 1.84 K/UL (ref 1–4.8)
LYMPHOCYTES NFR BLD: 24.8 % (ref 22–41)
MCH RBC QN AUTO: 28.1 PG (ref 27–33)
MCHC RBC AUTO-ENTMCNC: 32.6 G/DL (ref 33.6–35)
MCV RBC AUTO: 86.2 FL (ref 81.4–97.8)
MONOCYTES # BLD AUTO: 0.5 K/UL (ref 0–0.85)
MONOCYTES NFR BLD AUTO: 6.7 % (ref 0–13.4)
NEUTROPHILS # BLD AUTO: 4.78 K/UL (ref 2–7.15)
NEUTROPHILS NFR BLD: 64.4 % (ref 44–72)
NRBC # BLD AUTO: 0 K/UL
NRBC BLD-RTO: 0 /100 WBC
PLATELET # BLD AUTO: 423 K/UL (ref 164–446)
PMV BLD AUTO: 10.4 FL (ref 9–12.9)
RBC # BLD AUTO: 5.37 M/UL (ref 4.2–5.4)
WBC # BLD AUTO: 7.4 K/UL (ref 4.8–10.8)

## 2020-06-10 PROCEDURE — 82306 VITAMIN D 25 HYDROXY: CPT

## 2020-06-10 PROCEDURE — 36415 COLL VENOUS BLD VENIPUNCTURE: CPT

## 2020-06-10 PROCEDURE — 84439 ASSAY OF FREE THYROXINE: CPT

## 2020-06-10 PROCEDURE — 80061 LIPID PANEL: CPT

## 2020-06-10 PROCEDURE — 85025 COMPLETE CBC W/AUTO DIFF WBC: CPT

## 2020-06-10 PROCEDURE — 84443 ASSAY THYROID STIM HORMONE: CPT

## 2020-06-10 PROCEDURE — 83036 HEMOGLOBIN GLYCOSYLATED A1C: CPT

## 2020-06-11 LAB
25(OH)D3 SERPL-MCNC: 15 NG/ML (ref 30–100)
CHOLEST SERPL-MCNC: 137 MG/DL (ref 100–199)
HDLC SERPL-MCNC: 63 MG/DL
LDLC SERPL CALC-MCNC: 63 MG/DL
T4 FREE SERPL-MCNC: 1.23 NG/DL (ref 0.93–1.7)
TRIGL SERPL-MCNC: 57 MG/DL (ref 0–149)
TSH SERPL DL<=0.005 MIU/L-ACNC: 1.06 UIU/ML (ref 0.38–5.33)

## 2020-07-21 ENCOUNTER — OFFICE VISIT (OUTPATIENT)
Dept: MEDICAL GROUP | Facility: PHYSICIAN GROUP | Age: 55
End: 2020-07-21
Payer: COMMERCIAL

## 2020-07-21 VITALS
DIASTOLIC BLOOD PRESSURE: 80 MMHG | HEART RATE: 86 BPM | WEIGHT: 156.6 LBS | RESPIRATION RATE: 20 BRPM | BODY MASS INDEX: 23.73 KG/M2 | TEMPERATURE: 98.5 F | OXYGEN SATURATION: 98 % | SYSTOLIC BLOOD PRESSURE: 124 MMHG | HEIGHT: 68 IN

## 2020-07-21 DIAGNOSIS — E78.5 DYSLIPIDEMIA: ICD-10-CM

## 2020-07-21 DIAGNOSIS — G47.00 INSOMNIA, UNSPECIFIED TYPE: ICD-10-CM

## 2020-07-21 DIAGNOSIS — Z86.73 HX OF TRANSIENT ISCHEMIC ATTACK (TIA): ICD-10-CM

## 2020-07-21 DIAGNOSIS — E55.9 VITAMIN D DEFICIENCY: ICD-10-CM

## 2020-07-21 PROCEDURE — 99213 OFFICE O/P EST LOW 20 MIN: CPT | Performed by: NURSE PRACTITIONER

## 2020-07-21 RX ORDER — ZOLPIDEM TARTRATE 10 MG/1
10 TABLET ORAL NIGHTLY PRN
Qty: 30 TAB | Refills: 0 | Status: SHIPPED | OUTPATIENT
Start: 2020-07-04 | End: 2020-07-21 | Stop reason: SDUPTHER

## 2020-07-21 RX ORDER — ZOLPIDEM TARTRATE 10 MG/1
10 TABLET ORAL NIGHTLY PRN
Qty: 30 TAB | Refills: 0 | Status: SHIPPED | OUTPATIENT
Start: 2020-09-02 | End: 2020-07-21 | Stop reason: SDUPTHER

## 2020-07-21 RX ORDER — ERGOCALCIFEROL 1.25 MG/1
50000 CAPSULE ORAL
Qty: 12 CAP | Refills: 2 | Status: SHIPPED | OUTPATIENT
Start: 2020-07-21 | End: 2020-12-01 | Stop reason: SDUPTHER

## 2020-07-21 RX ORDER — ZOLPIDEM TARTRATE 10 MG/1
10 TABLET ORAL NIGHTLY PRN
Qty: 30 TAB | Refills: 0 | Status: SHIPPED | OUTPATIENT
Start: 2020-10-02 | End: 2020-09-01

## 2020-07-21 RX ORDER — ROSUVASTATIN CALCIUM 5 MG/1
5 TABLET, COATED ORAL DAILY
Qty: 90 TAB | Refills: 1 | Status: SHIPPED | OUTPATIENT
Start: 2020-07-21 | End: 2020-07-30

## 2020-07-21 RX ORDER — ZOLPIDEM TARTRATE 10 MG/1
10 TABLET ORAL NIGHTLY PRN
Qty: 30 TAB | Refills: 0 | Status: SHIPPED | OUTPATIENT
Start: 2020-08-03 | End: 2020-07-21 | Stop reason: SDUPTHER

## 2020-07-21 SDOH — HEALTH STABILITY: MENTAL HEALTH: HOW OFTEN DO YOU HAVE A DRINK CONTAINING ALCOHOL?: MONTHLY OR LESS

## 2020-07-21 ASSESSMENT — FIBROSIS 4 INDEX: FIB4 SCORE: 0.41

## 2020-07-22 NOTE — ASSESSMENT & PLAN NOTE
Reviewed recent labs, within normal limits, well controlled on rosuvastatin 5 mg daily, tolerating well, denies myalgia.  She denies CP or dyspnea.  No family history of MI or strokes.   Ref. Range 6/10/2020 07:38   Cholesterol,Tot Latest Ref Range: 100 - 199 mg/dL 137   Triglycerides Latest Ref Range: 0 - 149 mg/dL 57   HDL Latest Ref Range: >=40 mg/dL 63   LDL Latest Ref Range: <100 mg/dL 63

## 2020-07-22 NOTE — ASSESSMENT & PLAN NOTE
Chronic problem.  Patient requires refills for the next 3 months.  She has been stable on Ambien 10 mg nightly.  She has tried multiple alternatives in the past, following sleep hygiene.  She denies any side effects, denies disorientation, weakness or drowsiness.

## 2020-07-22 NOTE — PROGRESS NOTES
Chief Complaint   Patient presents with   • Follow-Up     Lab Results        HISTORY OF PRESENT ILLNESS: Patient is a 54 y.o. female, established patient who presents today to discuss medical problems as listed below:    Health Maintenance:  COMPLETED     Dyslipidemia  Reviewed recent labs, within normal limits, well controlled on rosuvastatin 5 mg daily, tolerating well, denies myalgia.  She denies CP or dyspnea.  No family history of MI or strokes.   Ref. Range 6/10/2020 07:38   Cholesterol,Tot Latest Ref Range: 100 - 199 mg/dL 137   Triglycerides Latest Ref Range: 0 - 149 mg/dL 57   HDL Latest Ref Range: >=40 mg/dL 63   LDL Latest Ref Range: <100 mg/dL 63       Vitamin D deficiency  Viewed recent labs, vitamin D at 15.  Patient is not on a supplement.    Insomnia  Chronic problem.  Patient requires refills for the next 3 months.  She has been stable on Ambien 10 mg nightly.  She has tried multiple alternatives in the past, following sleep hygiene.  She denies any side effects, denies disorientation, weakness or drowsiness.      Patient Active Problem List    Diagnosis Date Noted   • Vitamin D deficiency 07/21/2020   • Establishing care with new doctor, encounter for 04/08/2020   • Dyslipidemia 04/08/2020   • Nocturia 04/26/2019   • Hx of transient ischemic attack (TIA) 04/18/2019   • Insomnia 04/18/2019   • Connective tissue disease, undifferentiated (HCC) 04/18/2019   • Benign familial tremor 03/16/2017   • Ocular proptosis 03/16/2017   • Migraine without aura and without status migrainosus, not intractable 03/16/2017   • Degenerative cervical disc 04/05/2016        Allergies: Other food; Coconut fatty acids; and Peanut-derived    Current Outpatient Medications   Medication Sig Dispense Refill   • vitamin D, Ergocalciferol, (DRISDOL) 1.25 MG (24440 UT) Cap capsule Take 1 Cap by mouth every 7 days. 12 Cap 2   • rosuvastatin (CRESTOR) 5 MG Tab Take 1 Tab by mouth every day. 90 Tab 1   • [START ON 10/2/2020]  zolpidem (AMBIEN) 10 MG Tab Take 1 Tab by mouth at bedtime as needed for Sleep for up to 30 days. 30 Tab 0   • CAMPBELL 0.05 MG/24HR PATCH BIWEEKLY 0.05 mg by Apply externally route every day.     • progesterone (PROMETRIUM) 200 MG capsule Take 200 mg by mouth.     • NIFEdipine (ADALAT CC) 30 MG CR tablet      • Erenumab (AIMOVIG 140 DOSE) 70 MG/ML Solution Auto-injector Inject 70 mg as instructed Once.     • cyclosporin (RESTASIS) 0.05 % ophthalmic emulsion Place 1 Drop in both eyes 2 times a day.     • ondansetron (ZOFRAN ODT) 4 MG TABLET DISPERSIBLE Take 1 Tab by mouth every 8 hours as needed. 30 Tab 3   • mycophenolate (CELLCEPT) 250 MG Cap        No current facility-administered medications for this visit.        Social History     Tobacco Use   • Smoking status: Current Some Day Smoker     Packs/day: 0.25     Years: 30.00     Pack years: 7.50     Types: Cigarettes     Last attempt to quit: 2014     Years since quittin.3   • Smokeless tobacco: Never Used   Substance Use Topics   • Alcohol use: Yes     Alcohol/week: 0.0 oz     Frequency: Monthly or less     Binge frequency: Less than monthly     Comment: about 1 every other  month   • Drug use: No     Social History     Social History Narrative   • Not on file       Family History   Problem Relation Age of Onset   • Hypertension Mother    • Cancer Mother 50        breast cancer   • Cancer Father 50        Prostate cancer   • Hypertension Brother    • Diabetes Brother    • Heart Disease Paternal Grandfather         MI 70s   • Hypertension Brother        Allergies, past medical history, past surgical history, family history, social history reviewed and updated.    Review of Systems:     - Constitutional: Negative for fever, chills, unexpected weight change, and fatigue/generalized weakness.     - Respiratory: Negative for cough, sputum production, chest congestion, dyspnea, wheezing, and crackles.      - Cardiovascular: Negative for chest pain, palpitations,  "orthopnea, and bilateral lower extremity edema.     - Psychiatric/Behavioral: Negative for depression, suicidal/homicidal ideation and memory loss.      All other systems reviewed and are negative    Exam:    /80 (BP Location: Right arm, Patient Position: Sitting, BP Cuff Size: Adult)   Pulse 86   Temp 36.9 °C (98.5 °F) (Temporal)   Resp 20   Ht 1.715 m (5' 7.5\")   Wt 71 kg (156 lb 9.6 oz)   LMP  (LMP Unknown)   SpO2 98%   BMI 24.17 kg/m²  Body mass index is 24.17 kg/m².    Physical Exam:  Constitutional: Well-developed and well-nourished. Not diaphoretic. No distress.   Cardiovascular: Regular rate and rhythm, S1 and S2 without murmur, rubs, or gallops.    Chest: Effort normal. Clear to auscultation throughout. No adventitious sounds.   Neurological: Alert and oriented x 3.   Psychiatric:  Behavior, mood, and affect are appropriate.  MA/nursing note and vitals reviewed.    LABS: 6/10  results reviewed and discussed with the patient, questions answered.    Patient was seen for 15 minutes face to face of which > 50% of appointment time was spent on counseling and coordination of care regarding the above.     Assessment/Plan:  1. Vitamin D deficiency  Uncontrolled, stable.  Patient educated on etiology.  After Rx completion, take OTC vitamin D3 5000 IUs daily.  - vitamin D, Ergocalciferol, (DRISDOL) 1.25 MG (90473 UT) Cap capsule; Take 1 Cap by mouth every 7 days.  Dispense: 12 Cap; Refill: 2    2. Hx of transient ischemic attack (TIA)  - rosuvastatin (CRESTOR) 5 MG Tab; Take 1 Tab by mouth every day.  Dispense: 90 Tab; Refill: 1    3. Dyslipidemia  Well-controlled on current medication.  [place]acey healthy lifestyle.  To new current regimen.  Refills given today.  - rosuvastatin (CRESTOR) 5 MG Tab; Take 1 Tab by mouth every day.  Dispense: 90 Tab; Refill: 1    4. Insomnia, unspecified type  Stable on current medication.  Refills given today.  Controlled substance agreement signed on 4/8/2020 and will be " renewed each year.  Patient understands this prescription is a controlled substance which is potentially habit-forming and its use is regulated by the [place]. Refills are subject to terms of a controlled substance agreement and patient has an updated one on file. Any refill requires an office visit. This medicine can cause nausea, significant constipation, sedation, confusion and accidental overdose.  [employer] Query reviewed. Controlled substance agreement signed.    This patient is continuing to use a controlled substance (CS) on a long term basis.  The patient is thoroughly aware of the goals of treatment with the CS  The patient is aware that yearly and random urine drug screens are required.  The patient has been instructed to take the CS only as prescribed.  The patient is prohibited from sharing the CS with any other person.  The patient is instructed to inform the provider if any other CS is taken, of any alcohol or cannabis or other recreational drug use, any treatment for side effects of the CS or complications, if they have CS active rx in other states  The patient has evidence for a reason for the CS  The treatment plan has been discussed with the patient  The  report has been reviewed     - zolpidem (AMBIEN) 10 MG Tab; Take 1 Tab by mouth at bedtime as needed for Sleep for up to 30 days.  Dispense: 30 Tab; Refill: 0       Discussed with patient possible alternative diagnoses, pt is to take all medications as prescribed. If symptoms persist FU w/PCP, if symptoms worsen go to emergency room. If experiencing any side effects from prescribed medications reports to the office immediately or go to emergency room.  Reviewed indication, dosage, usage and potential adverse effects of prescribed medications. Reviewed risks and benefits of treatment plan. Patient verbalizes understanding of all instruction and verbally agrees to plan.    Return if symptoms worsen or fail to improve.

## 2020-07-29 DIAGNOSIS — Z86.73 HX OF TRANSIENT ISCHEMIC ATTACK (TIA): ICD-10-CM

## 2020-07-29 DIAGNOSIS — E78.5 DYSLIPIDEMIA: ICD-10-CM

## 2020-07-30 RX ORDER — ROSUVASTATIN CALCIUM 5 MG/1
TABLET, COATED ORAL
Qty: 90 TAB | Refills: 3 | Status: SHIPPED | OUTPATIENT
Start: 2020-07-30 | End: 2021-04-06 | Stop reason: SDUPTHER

## 2020-08-31 DIAGNOSIS — G47.00 INSOMNIA, UNSPECIFIED TYPE: ICD-10-CM

## 2020-09-01 RX ORDER — ZOLPIDEM TARTRATE 10 MG/1
TABLET ORAL
Qty: 30 TAB | Refills: 0 | Status: SHIPPED | OUTPATIENT
Start: 2020-09-01 | End: 2020-10-01

## 2020-10-01 DIAGNOSIS — G47.00 INSOMNIA, UNSPECIFIED TYPE: ICD-10-CM

## 2020-10-01 RX ORDER — ZOLPIDEM TARTRATE 10 MG/1
TABLET ORAL
Qty: 30 TAB | Refills: 0 | Status: SHIPPED | OUTPATIENT
Start: 2020-10-01 | End: 2020-10-13 | Stop reason: SDUPTHER

## 2020-10-13 ENCOUNTER — OFFICE VISIT (OUTPATIENT)
Dept: MEDICAL GROUP | Facility: PHYSICIAN GROUP | Age: 55
End: 2020-10-13
Payer: COMMERCIAL

## 2020-10-13 VITALS
TEMPERATURE: 98 F | SYSTOLIC BLOOD PRESSURE: 116 MMHG | WEIGHT: 155.8 LBS | HEART RATE: 81 BPM | HEIGHT: 68 IN | DIASTOLIC BLOOD PRESSURE: 60 MMHG | RESPIRATION RATE: 20 BRPM | OXYGEN SATURATION: 99 % | BODY MASS INDEX: 23.61 KG/M2

## 2020-10-13 DIAGNOSIS — G47.00 INSOMNIA, UNSPECIFIED TYPE: ICD-10-CM

## 2020-10-13 PROCEDURE — 99213 OFFICE O/P EST LOW 20 MIN: CPT | Performed by: NURSE PRACTITIONER

## 2020-10-13 RX ORDER — ZOLPIDEM TARTRATE 10 MG/1
10 TABLET ORAL NIGHTLY PRN
Qty: 30 TAB | Refills: 0 | Status: SHIPPED
Start: 2020-12-01 | End: 2020-10-13

## 2020-10-13 RX ORDER — ZOLPIDEM TARTRATE 10 MG/1
10 TABLET ORAL NIGHTLY PRN
Qty: 30 TAB | Refills: 0 | Status: SHIPPED | OUTPATIENT
Start: 2020-10-30 | End: 2020-11-29

## 2020-10-13 RX ORDER — ZOLPIDEM TARTRATE 10 MG/1
10 TABLET ORAL NIGHTLY PRN
Qty: 30 TAB | Refills: 0 | Status: SHIPPED | OUTPATIENT
Start: 2020-12-29 | End: 2021-01-14 | Stop reason: SDUPTHER

## 2020-10-13 RX ORDER — ZOLPIDEM TARTRATE 10 MG/1
10 TABLET ORAL NIGHTLY PRN
Qty: 30 TAB | Refills: 0 | Status: SHIPPED | OUTPATIENT
Start: 2020-11-29 | End: 2020-12-01

## 2020-10-13 RX ORDER — ZOLPIDEM TARTRATE 10 MG/1
10 TABLET ORAL NIGHTLY PRN
Qty: 30 TAB | Refills: 0 | Status: SHIPPED
Start: 2020-11-01 | End: 2020-10-13

## 2020-10-13 RX ORDER — ZOLPIDEM TARTRATE 10 MG/1
10 TABLET ORAL NIGHTLY PRN
Qty: 30 TAB | Refills: 0 | Status: SHIPPED
Start: 2020-12-31 | End: 2020-10-13

## 2020-10-13 RX ORDER — MELOXICAM 5 MG/1
5 CAPSULE ORAL DAILY
COMMUNITY
End: 2022-01-18

## 2020-10-13 ASSESSMENT — FIBROSIS 4 INDEX: FIB4 SCORE: 0.41

## 2020-10-13 NOTE — ASSESSMENT & PLAN NOTE
New problem.  Well-controlled on Ambien 10 mg nightly.  She is implementing sleep hygiene, and has tried trazodone in the past.  He is here for Ambien refill.  Denies drowsiness, sleepiness, memory issues.  Reports feeling more energy and refreshed after good night sleep with the help of Ambien.

## 2020-10-13 NOTE — PROGRESS NOTES
Chief Complaint   Patient presents with   • Follow-Up     3 mths        HISTORY OF PRESENT ILLNESS: Patient is a 54 y.o. female, established patient who presents today to discuss medical problems as listed below:    Health Maintenance:  COMPLETED     Insomnia  New problem.  Well-controlled on Ambien 10 mg nightly.  She is implementing sleep hygiene, and has tried trazodone in the past.  He is here for Ambien refill.  Denies drowsiness, sleepiness, memory issues.       Patient Active Problem List    Diagnosis Date Noted   • Vitamin D deficiency 07/21/2020   • Establishing care with new doctor, encounter for 04/08/2020   • Dyslipidemia 04/08/2020   • Nocturia 04/26/2019   • Hx of transient ischemic attack (TIA) 04/18/2019   • Insomnia 04/18/2019   • Connective tissue disease, undifferentiated (HCC) 04/18/2019   • Benign familial tremor 03/16/2017   • Ocular proptosis 03/16/2017   • Migraine without aura and without status migrainosus, not intractable 03/16/2017   • Degenerative cervical disc 04/05/2016        Allergies: Other food, Coconut fatty acids, and Peanut-derived    Current Outpatient Medications   Medication Sig Dispense Refill   • Meloxicam 5 MG Cap Take 5 mg by mouth every day.     • [START ON 10/30/2020] zolpidem (AMBIEN) 10 MG Tab Take 1 Tab by mouth at bedtime as needed for Sleep for up to 30 days. 30 Tab 0   • [START ON 11/29/2020] zolpidem (AMBIEN) 10 MG Tab Take 1 Tab by mouth at bedtime as needed for Sleep for up to 30 days. 30 Tab 0   • [START ON 12/29/2020] zolpidem (AMBIEN) 10 MG Tab Take 1 Tab by mouth at bedtime as needed for Sleep for up to 30 days. 30 Tab 0   • rosuvastatin (CRESTOR) 5 MG Tab TAKE 1 TABLET EVERY EVENING 90 Tab 3   • vitamin D, Ergocalciferol, (DRISDOL) 1.25 MG (41419 UT) Cap capsule Take 1 Cap by mouth every 7 days. 12 Cap 2   • progesterone (PROMETRIUM) 200 MG capsule Take 200 mg by mouth.     • NIFEdipine (ADALAT CC) 30 MG CR tablet      • Erenumab (AIMOVIG 140 DOSE) 70  MG/ML Solution Auto-injector Inject 70 mg as instructed Once.     • cyclosporin (RESTASIS) 0.05 % ophthalmic emulsion Place 1 Drop in both eyes 2 times a day.     • ondansetron (ZOFRAN ODT) 4 MG TABLET DISPERSIBLE Take 1 Tab by mouth every 8 hours as needed. 30 Tab 3   • mycophenolate (CELLCEPT) 250 MG Cap      • CAMPBELL 0.05 MG/24HR PATCH BIWEEKLY 0.05 mg by Apply externally route every day.       No current facility-administered medications for this visit.        Social History     Tobacco Use   • Smoking status: Current Some Day Smoker     Packs/day: 0.25     Years: 30.00     Pack years: 7.50     Types: Cigarettes     Last attempt to quit: 2014     Years since quittin.5   • Smokeless tobacco: Never Used   Substance Use Topics   • Alcohol use: Yes     Alcohol/week: 0.0 oz     Frequency: Monthly or less     Binge frequency: Less than monthly     Comment: about 1 every other  month   • Drug use: No     Social History     Social History Narrative   • Not on file       Family History   Problem Relation Age of Onset   • Hypertension Mother    • Cancer Mother 50        breast cancer   • Cancer Father 50        Prostate cancer   • Hypertension Brother    • Diabetes Brother    • Heart Disease Paternal Grandfather         MI 70s   • Hypertension Brother        Allergies, past medical history, past surgical history, family history, social history reviewed and updated.    Review of Systems:     - Constitutional: Negative for fever, chills, unexpected weight change, and fatigue/generalized weakness.       - Respiratory: Negative for cough, sputum production, chest congestion, dyspnea, wheezing, and crackles.      - Cardiovascular: Negative for chest pain, palpitations, orthopnea, and bilateral lower extremity edema.     - Psychiatric/Behavioral: Negative for depression, suicidal/homicidal ideation and memory loss.      All other systems reviewed and are negative    Exam:    /60 (BP Location: Left arm, Patient  "Position: Sitting, BP Cuff Size: Adult)   Pulse 81   Temp 36.7 °C (98 °F) (Temporal)   Resp 20   Ht 1.727 m (5' 8\")   Wt 70.7 kg (155 lb 12.8 oz)   LMP  (LMP Unknown)   SpO2 99%   BMI 23.69 kg/m²  Body mass index is 23.69 kg/m².    Physical Exam:  Constitutional: Well-developed and well-nourished. Not diaphoretic. No distress.   Cardiovascular: Regular rate and rhythm, S1 and S2 without murmur, rubs, or gallops.    Chest: Effort normal. Clear to auscultation throughout. No adventitious sounds.   Neurological: Alert and oriented x 3.  Psychiatric:  Behavior, mood, and affect are appropriate.  MA/nursing note and vitals reviewed.    Assessment/Plan:  1. Insomnia, unspecified type  Stable on Ambien.  Refills given.  Patient understands this prescription is a controlled substance which is potentially habit-forming and its use is regulated by the [place].  Refills are subject to terms of a controlled substance agreement and patient has an updated one on file. Any refill requires an office visit. This medicine can cause sedation, confusion and accidental overdose.  PDMP reviewed. Controlled substance agreement signed on 4/8/20. Pt picked up the last 30-day refill on October 1, 2020    This patient is continuing to use a controlled substance (CS) on a long term basis.  The patient is thoroughly aware of the goals of treatment with the CS  The patient is aware that yearly and random urine drug screens are required.  The patient has been instructed to take the CS only as prescribed.  The patient is prohibited from sharing the CS with any other person.  The patient is instructed to inform the provider if any other CS is taken, of any alcohol or cannabis or other recreational drug use, any treatment for side effects of the CS or complications, if they have CS active rx in other states  The patient has evidence for a reason for the CS  The treatment plan has been discussed with the patient  The  report has been " reviewed     - zolpidem (AMBIEN) 10 MG Tab; Take 1 Tab by mouth at bedtime as needed for Sleep for up to 30 days.  Dispense: 30 Tab; Refill: 0  - zolpidem (AMBIEN) 10 MG Tab; Take 1 Tab by mouth at bedtime as needed for Sleep for up to 30 days.  Dispense: 30 Tab; Refill: 0  - zolpidem (AMBIEN) 10 MG Tab; Take 1 Tab by mouth at bedtime as needed for Sleep for up to 30 days.  Dispense: 30 Tab; Refill: 0       Discussed with patient possible alternative diagnoses, pt is to take all medications as prescribed. If symptoms persist FU w/PCP, if symptoms worsen go to emergency room. If experiencing any side effects from prescribed medications reports to the office immediately or go to emergency room.  Reviewed indication, dosage, usage and potential adverse effects of prescribed medications. Reviewed risks and benefits of treatment plan. Patient verbalizes understanding of all instruction and verbally agrees to plan.    Return if symptoms worsen or fail to improve.

## 2020-12-01 DIAGNOSIS — E55.9 VITAMIN D DEFICIENCY: ICD-10-CM

## 2020-12-01 DIAGNOSIS — G47.00 INSOMNIA, UNSPECIFIED TYPE: ICD-10-CM

## 2020-12-01 RX ORDER — ZOLPIDEM TARTRATE 10 MG/1
TABLET ORAL
Qty: 30 EACH | Refills: 0 | Status: SHIPPED | OUTPATIENT
Start: 2020-12-01 | End: 2020-12-30

## 2020-12-01 RX ORDER — ERGOCALCIFEROL 1.25 MG/1
50000 CAPSULE ORAL
Qty: 12 CAP | Refills: 2 | Status: SHIPPED | OUTPATIENT
Start: 2020-12-01 | End: 2021-01-14 | Stop reason: SDUPTHER

## 2020-12-28 DIAGNOSIS — G47.00 INSOMNIA, UNSPECIFIED TYPE: ICD-10-CM

## 2020-12-30 RX ORDER — ZOLPIDEM TARTRATE 10 MG/1
TABLET ORAL
Qty: 30 EACH | Refills: 0 | Status: SHIPPED
Start: 2020-12-30 | End: 2021-01-14

## 2021-01-14 ENCOUNTER — OFFICE VISIT (OUTPATIENT)
Dept: MEDICAL GROUP | Facility: PHYSICIAN GROUP | Age: 56
End: 2021-01-14
Payer: COMMERCIAL

## 2021-01-14 VITALS
BODY MASS INDEX: 23.89 KG/M2 | TEMPERATURE: 99.5 F | RESPIRATION RATE: 16 BRPM | SYSTOLIC BLOOD PRESSURE: 142 MMHG | HEIGHT: 68 IN | OXYGEN SATURATION: 100 % | HEART RATE: 60 BPM | DIASTOLIC BLOOD PRESSURE: 96 MMHG | WEIGHT: 157.6 LBS

## 2021-01-14 DIAGNOSIS — G47.00 INSOMNIA, UNSPECIFIED TYPE: ICD-10-CM

## 2021-01-14 DIAGNOSIS — E55.9 VITAMIN D DEFICIENCY: ICD-10-CM

## 2021-01-14 DIAGNOSIS — Z79.899 CONTROLLED SUBSTANCE AGREEMENT SIGNED: ICD-10-CM

## 2021-01-14 DIAGNOSIS — Z00.00 ANNUAL PHYSICAL EXAM: ICD-10-CM

## 2021-01-14 PROCEDURE — 99214 OFFICE O/P EST MOD 30 MIN: CPT | Performed by: NURSE PRACTITIONER

## 2021-01-14 RX ORDER — ZOLPIDEM TARTRATE 10 MG/1
10 TABLET ORAL NIGHTLY PRN
Qty: 90 TAB | Refills: 0 | Status: SHIPPED | OUTPATIENT
Start: 2021-01-28 | End: 2021-04-06 | Stop reason: SDUPTHER

## 2021-01-14 RX ORDER — ERGOCALCIFEROL 1.25 MG/1
50000 CAPSULE ORAL
Qty: 12 CAP | Refills: 3 | Status: SHIPPED | OUTPATIENT
Start: 2021-01-14 | End: 2022-01-18 | Stop reason: SDUPTHER

## 2021-01-14 RX ORDER — LOSARTAN POTASSIUM 50 MG/1
50 TABLET ORAL DAILY
COMMUNITY
Start: 2021-01-11 | End: 2021-02-10

## 2021-01-14 ASSESSMENT — FIBROSIS 4 INDEX: FIB4 SCORE: 0.42

## 2021-01-14 ASSESSMENT — PATIENT HEALTH QUESTIONNAIRE - PHQ9: CLINICAL INTERPRETATION OF PHQ2 SCORE: 0

## 2021-01-15 NOTE — PROGRESS NOTES
Chief Complaint   Patient presents with   • Insomnia     med refill       HISTORY OF PRESENT ILLNESS: Patient is a 55 y.o. female, established patient who presents today to discuss medical problems as listed below:    Health Maintenance:  COMPLETED     Insomnia  Chronic problem.  Well-controlled with Ambien 10 mg nightly.  Has tried multiple prescription and OTC management without success.  Requiring refills.  She denies drowsiness, disorientation or falls.  Last refill 12/31/2020 per PDMP.  Last controlled substance agreement signed in April 2020.    Vitamin D deficiency  Patient was recently diagnosed with vitamin D deficiency, she started taking Rx strength vitamin D, tolerating well.  She is requesting refills on this.  We will recheck vitamin D levels next year.      Patient Active Problem List    Diagnosis Date Noted   • Vitamin D deficiency 07/21/2020   • Establishing care with new doctor, encounter for 04/08/2020   • Dyslipidemia 04/08/2020   • Nocturia 04/26/2019   • Hx of transient ischemic attack (TIA) 04/18/2019   • Insomnia 04/18/2019   • Connective tissue disease, undifferentiated (HCC) 04/18/2019   • Benign familial tremor 03/16/2017   • Ocular proptosis 03/16/2017   • Migraine without aura and without status migrainosus, not intractable 03/16/2017   • Degenerative cervical disc 04/05/2016        Allergies: Other food, Coconut fatty acids, and Peanut-derived    Current Outpatient Medications   Medication Sig Dispense Refill   • losartan (COZAAR) 50 MG Tab Take 50 mg by mouth every day.     • ESTRADIOL PO Take  by mouth.     • vitamin D, Ergocalciferol, (DRISDOL) 1.25 MG (20573 UT) Cap capsule Take 1 Cap by mouth every 7 days. 12 Cap 3   • [START ON 1/28/2021] zolpidem (AMBIEN) 10 MG Tab Take 1 Tab by mouth at bedtime as needed for Sleep for up to 90 days. 90 Tab 0   • Meloxicam 5 MG Cap Take 5 mg by mouth every day.     • rosuvastatin (CRESTOR) 5 MG Tab TAKE 1 TABLET EVERY EVENING 90 Tab 3   •  progesterone (PROMETRIUM) 200 MG capsule Take 200 mg by mouth.     • Erenumab (AIMOVIG 140 DOSE) 70 MG/ML Solution Auto-injector Inject 70 mg as instructed Once.     • cyclosporin (RESTASIS) 0.05 % ophthalmic emulsion Place 1 Drop in both eyes 2 times a day.     • ondansetron (ZOFRAN ODT) 4 MG TABLET DISPERSIBLE Take 1 Tab by mouth every 8 hours as needed. 30 Tab 3   • mycophenolate (CELLCEPT) 250 MG Cap      • CAMPBELL 0.05 MG/24HR PATCH BIWEEKLY 0.05 mg by Apply externally route every day.     • NIFEdipine (ADALAT CC) 30 MG CR tablet        No current facility-administered medications for this visit.        Social History     Tobacco Use   • Smoking status: Former Smoker     Packs/day: 0.25     Years: 30.00     Pack years: 7.50     Types: Cigarettes     Quit date: 2014     Years since quittin.7   • Smokeless tobacco: Never Used   Substance Use Topics   • Alcohol use: Yes     Alcohol/week: 0.0 oz     Frequency: Monthly or less     Binge frequency: Less than monthly     Comment: about 1 every other  month   • Drug use: No     Social History     Social History Narrative   • Not on file       Family History   Problem Relation Age of Onset   • Hypertension Mother    • Cancer Mother 50        breast cancer   • Cancer Father 50        Prostate cancer   • Hypertension Brother    • Diabetes Brother    • Heart Disease Paternal Grandfather         MI 70s   • Hypertension Brother        Allergies, past medical history, past surgical history, family history, social history reviewed and updated.    Review of Systems:     - Constitutional: Negative for fever, chills, unexpected weight change, and fatigue/generalized weakness.       - Respiratory: Negative for cough, sputum production, chest congestion, dyspnea, wheezing, and crackles.      - Cardiovascular: Negative for chest pain, palpitations, orthopnea, and bilateral lower extremity edema.     - Psychiatric/Behavioral: Negative for depression, suicidal/homicidal ideation  "and memory loss.      All other systems reviewed and are negative    Exam:    /96   Pulse 60   Temp 37.5 °C (99.5 °F) (Temporal)   Resp 16   Ht 1.715 m (5' 7.5\")   Wt 71.5 kg (157 lb 9.6 oz)   LMP  (LMP Unknown)   SpO2 100%   BMI 24.32 kg/m²  Body mass index is 24.32 kg/m².    Physical Exam:  Constitutional: Well-developed and well-nourished. Not diaphoretic. No distress.   Cardiovascular: Regular rate and rhythm, S1 and S2 without murmur, rubs, or gallops.    Chest: Effort normal. Clear to auscultation throughout. No adventitious sounds.  Neurological: Alert and oriented x 3.   Psychiatric:  Behavior, mood, and affect are appropriate.  MA/nursing note and vitals reviewed.    LABS: 2020  results reviewed and discussed with the patient, questions answered.    Patient was seen for 25 minutes face to face of which > 50% of appointment time was spent on counseling and coordination of care regarding the above.     Assessment/Plan:  1. Vitamin D deficiency  - vitamin D, Ergocalciferol, (DRISDOL) 1.25 MG (28884 UT) Cap capsule; Take 1 Cap by mouth every 7 days.  Dispense: 12 Cap; Refill: 3  - VITAMIN D,25 HYDROXY; Future    2. Insomnia, unspecified type  Stable on current regimen.  Continue.  Refills given.  Patient understands this prescription is a controlled substance which is potentially habit-forming and its use is regulated by the [place]. Refills are subject to terms of a controlled substance agreement and patient has an updated one on file. Any refill requires an office visit. PDMP reviewed. Controlled substance agreement signed.    This patient is continuing to use a controlled substance (CS) on a long term basis.  The patient is thoroughly aware of the goals of treatment with the CS  The patient is aware that yearly and random urine drug screens are required.  The patient has been instructed to take the CS only as prescribed.  The patient is prohibited from sharing the CS with any other person.  The " patient is instructed to inform the provider if any other CS is taken, of any alcohol or cannabis or other recreational drug use, any treatment for side effects of the CS or complications, if they have CS active rx in other states  The patient has evidence for a reason for the CS  The treatment plan has been discussed with the patient  The  report has been reviewed     - zolpidem (AMBIEN) 10 MG Tab; Take 1 Tab by mouth at bedtime as needed for Sleep for up to 90 days.  Dispense: 90 Tab; Refill: 0    3. Controlled substance agreement signed  - Controlled Substance Treatment Agreement    4. Annual physical exam  - CBC WITH DIFFERENTIAL; Future  - Comp Metabolic Panel; Future  - Lipid Profile; Future       Discussed with patient possible alternative diagnoses, pt is to take all medications as prescribed. If symptoms persist FU w/PCP, if symptoms worsen go to emergency room. If experiencing any side effects from prescribed medications reports to the office immediately or go to emergency room.  Reviewed indication, dosage, usage and potential adverse effects of prescribed medications. Reviewed risks and benefits of treatment plan. Patient verbalizes understanding of all instruction and verbally agrees to plan.    No follow-ups on file.

## 2021-01-15 NOTE — ASSESSMENT & PLAN NOTE
Chronic problem.  Well-controlled with Ambien 10 mg nightly.  Has tried multiple prescription and OTC management without success.  Requiring refills.  She denies drowsiness, disorientation or falls.  Last refill 12/31/2020 per PDMP.  Last controlled substance agreement signed in April 2020.

## 2021-01-15 NOTE — ASSESSMENT & PLAN NOTE
Patient was recently diagnosed with vitamin D deficiency, she started taking Rx strength vitamin D, tolerating well.  She is requesting refills on this.  We will recheck vitamin D levels next year.

## 2021-01-18 ENCOUNTER — HOSPITAL ENCOUNTER (OUTPATIENT)
Dept: LAB | Facility: MEDICAL CENTER | Age: 56
End: 2021-01-18
Attending: INTERNAL MEDICINE
Payer: COMMERCIAL

## 2021-01-18 LAB
ALBUMIN SERPL BCP-MCNC: 4.3 G/DL (ref 3.2–4.9)
ALBUMIN/GLOB SERPL: 1.7 G/DL
ALP SERPL-CCNC: 43 U/L (ref 30–99)
ALT SERPL-CCNC: 9 U/L (ref 2–50)
ANION GAP SERPL CALC-SCNC: 10 MMOL/L (ref 7–16)
AST SERPL-CCNC: 11 U/L (ref 12–45)
BASOPHILS # BLD AUTO: 0.9 % (ref 0–1.8)
BASOPHILS # BLD: 0.06 K/UL (ref 0–0.12)
BILIRUB SERPL-MCNC: 0.4 MG/DL (ref 0.1–1.5)
BUN SERPL-MCNC: 12 MG/DL (ref 8–22)
CALCIUM SERPL-MCNC: 9.1 MG/DL (ref 8.5–10.5)
CHLORIDE SERPL-SCNC: 107 MMOL/L (ref 96–112)
CO2 SERPL-SCNC: 24 MMOL/L (ref 20–33)
CREAT SERPL-MCNC: 0.89 MG/DL (ref 0.5–1.4)
CRP SERPL HS-MCNC: 0.1 MG/DL (ref 0–0.75)
EOSINOPHIL # BLD AUTO: 0.21 K/UL (ref 0–0.51)
EOSINOPHIL NFR BLD: 3.3 % (ref 0–6.9)
ERYTHROCYTE [DISTWIDTH] IN BLOOD BY AUTOMATED COUNT: 43.3 FL (ref 35.9–50)
ERYTHROCYTE [SEDIMENTATION RATE] IN BLOOD BY WESTERGREN METHOD: 1 MM/HOUR (ref 0–30)
GLOBULIN SER CALC-MCNC: 2.5 G/DL (ref 1.9–3.5)
GLUCOSE SERPL-MCNC: 114 MG/DL (ref 65–99)
HCT VFR BLD AUTO: 44.4 % (ref 37–47)
HGB BLD-MCNC: 14.3 G/DL (ref 12–16)
IMM GRANULOCYTES # BLD AUTO: 0.02 K/UL (ref 0–0.11)
IMM GRANULOCYTES NFR BLD AUTO: 0.3 % (ref 0–0.9)
LYMPHOCYTES # BLD AUTO: 1.95 K/UL (ref 1–4.8)
LYMPHOCYTES NFR BLD: 30.9 % (ref 22–41)
MCH RBC QN AUTO: 27.9 PG (ref 27–33)
MCHC RBC AUTO-ENTMCNC: 32.2 G/DL (ref 33.6–35)
MCV RBC AUTO: 86.7 FL (ref 81.4–97.8)
MONOCYTES # BLD AUTO: 0.5 K/UL (ref 0–0.85)
MONOCYTES NFR BLD AUTO: 7.9 % (ref 0–13.4)
NEUTROPHILS # BLD AUTO: 3.58 K/UL (ref 2–7.15)
NEUTROPHILS NFR BLD: 56.7 % (ref 44–72)
NRBC # BLD AUTO: 0 K/UL
NRBC BLD-RTO: 0 /100 WBC
PLATELET # BLD AUTO: 355 K/UL (ref 164–446)
PMV BLD AUTO: 10.8 FL (ref 9–12.9)
POTASSIUM SERPL-SCNC: 3.8 MMOL/L (ref 3.6–5.5)
PROT SERPL-MCNC: 6.8 G/DL (ref 6–8.2)
RBC # BLD AUTO: 5.12 M/UL (ref 4.2–5.4)
SODIUM SERPL-SCNC: 141 MMOL/L (ref 135–145)
WBC # BLD AUTO: 6.3 K/UL (ref 4.8–10.8)

## 2021-01-18 PROCEDURE — 36415 COLL VENOUS BLD VENIPUNCTURE: CPT

## 2021-01-18 PROCEDURE — 85652 RBC SED RATE AUTOMATED: CPT

## 2021-01-18 PROCEDURE — 80053 COMPREHEN METABOLIC PANEL: CPT

## 2021-01-18 PROCEDURE — 86140 C-REACTIVE PROTEIN: CPT

## 2021-01-18 PROCEDURE — 85025 COMPLETE CBC W/AUTO DIFF WBC: CPT

## 2021-04-05 ENCOUNTER — HOSPITAL ENCOUNTER (OUTPATIENT)
Dept: LAB | Facility: MEDICAL CENTER | Age: 56
End: 2021-04-05
Attending: NURSE PRACTITIONER
Payer: COMMERCIAL

## 2021-04-05 LAB
BASOPHILS # BLD AUTO: 1.1 % (ref 0–1.8)
BASOPHILS # BLD: 0.08 K/UL (ref 0–0.12)
EOSINOPHIL # BLD AUTO: 0.06 K/UL (ref 0–0.51)
EOSINOPHIL NFR BLD: 0.8 % (ref 0–6.9)
ERYTHROCYTE [DISTWIDTH] IN BLOOD BY AUTOMATED COUNT: 44.4 FL (ref 35.9–50)
HCT VFR BLD AUTO: 42.7 % (ref 37–47)
HGB BLD-MCNC: 14 G/DL (ref 12–16)
IMM GRANULOCYTES # BLD AUTO: 0.01 K/UL (ref 0–0.11)
IMM GRANULOCYTES NFR BLD AUTO: 0.1 % (ref 0–0.9)
LYMPHOCYTES # BLD AUTO: 1.41 K/UL (ref 1–4.8)
LYMPHOCYTES NFR BLD: 19.9 % (ref 22–41)
MCH RBC QN AUTO: 28.1 PG (ref 27–33)
MCHC RBC AUTO-ENTMCNC: 32.8 G/DL (ref 33.6–35)
MCV RBC AUTO: 85.6 FL (ref 81.4–97.8)
MONOCYTES # BLD AUTO: 0.46 K/UL (ref 0–0.85)
MONOCYTES NFR BLD AUTO: 6.5 % (ref 0–13.4)
NEUTROPHILS # BLD AUTO: 5.05 K/UL (ref 2–7.15)
NEUTROPHILS NFR BLD: 71.6 % (ref 44–72)
NRBC # BLD AUTO: 0 K/UL
NRBC BLD-RTO: 0 /100 WBC
PLATELET # BLD AUTO: 334 K/UL (ref 164–446)
PMV BLD AUTO: 11.1 FL (ref 9–12.9)
RBC # BLD AUTO: 4.99 M/UL (ref 4.2–5.4)
WBC # BLD AUTO: 7.1 K/UL (ref 4.8–10.8)

## 2021-04-05 PROCEDURE — 86140 C-REACTIVE PROTEIN: CPT

## 2021-04-05 PROCEDURE — 85025 COMPLETE CBC W/AUTO DIFF WBC: CPT

## 2021-04-05 PROCEDURE — 85652 RBC SED RATE AUTOMATED: CPT

## 2021-04-05 PROCEDURE — 80053 COMPREHEN METABOLIC PANEL: CPT

## 2021-04-05 PROCEDURE — 82306 VITAMIN D 25 HYDROXY: CPT

## 2021-04-05 PROCEDURE — 36415 COLL VENOUS BLD VENIPUNCTURE: CPT

## 2021-04-06 ENCOUNTER — TELEMEDICINE (OUTPATIENT)
Dept: MEDICAL GROUP | Facility: PHYSICIAN GROUP | Age: 56
End: 2021-04-06
Payer: COMMERCIAL

## 2021-04-06 VITALS — HEIGHT: 68 IN | WEIGHT: 157 LBS | BODY MASS INDEX: 23.79 KG/M2

## 2021-04-06 DIAGNOSIS — E78.5 DYSLIPIDEMIA: ICD-10-CM

## 2021-04-06 DIAGNOSIS — Z86.73 HX OF TRANSIENT ISCHEMIC ATTACK (TIA): ICD-10-CM

## 2021-04-06 DIAGNOSIS — G47.00 INSOMNIA, UNSPECIFIED TYPE: ICD-10-CM

## 2021-04-06 LAB
ALBUMIN SERPL BCP-MCNC: 4.2 G/DL (ref 3.2–4.9)
ALBUMIN/GLOB SERPL: 1.5 G/DL
ALP SERPL-CCNC: 48 U/L (ref 30–99)
ALT SERPL-CCNC: 13 U/L (ref 2–50)
ANION GAP SERPL CALC-SCNC: 10 MMOL/L (ref 7–16)
AST SERPL-CCNC: 14 U/L (ref 12–45)
BILIRUB SERPL-MCNC: 0.5 MG/DL (ref 0.1–1.5)
BUN SERPL-MCNC: 7 MG/DL (ref 8–22)
CALCIUM SERPL-MCNC: 9.2 MG/DL (ref 8.5–10.5)
CHLORIDE SERPL-SCNC: 109 MMOL/L (ref 96–112)
CO2 SERPL-SCNC: 23 MMOL/L (ref 20–33)
CREAT SERPL-MCNC: 0.73 MG/DL (ref 0.5–1.4)
CRP SERPL HS-MCNC: <0.3 MG/DL (ref 0–0.75)
ERYTHROCYTE [SEDIMENTATION RATE] IN BLOOD BY WESTERGREN METHOD: 4 MM/HOUR (ref 0–30)
GLOBULIN SER CALC-MCNC: 2.8 G/DL (ref 1.9–3.5)
GLUCOSE SERPL-MCNC: 107 MG/DL (ref 65–99)
POTASSIUM SERPL-SCNC: 4.1 MMOL/L (ref 3.6–5.5)
PROT SERPL-MCNC: 7 G/DL (ref 6–8.2)
SODIUM SERPL-SCNC: 142 MMOL/L (ref 135–145)

## 2021-04-06 PROCEDURE — 99213 OFFICE O/P EST LOW 20 MIN: CPT | Mod: 95,CR | Performed by: NURSE PRACTITIONER

## 2021-04-06 RX ORDER — LOSARTAN POTASSIUM 25 MG/1
TABLET ORAL
COMMUNITY
Start: 2021-03-31 | End: 2021-07-01

## 2021-04-06 RX ORDER — ROSUVASTATIN CALCIUM 5 MG/1
5 TABLET, COATED ORAL NIGHTLY PRN
Qty: 90 TABLET | Refills: 3 | Status: SHIPPED | OUTPATIENT
Start: 2021-04-06 | End: 2022-06-30

## 2021-04-06 RX ORDER — ZOLPIDEM TARTRATE 10 MG/1
10 TABLET ORAL NIGHTLY PRN
Qty: 30 TABLET | Refills: 2 | Status: SHIPPED | OUTPATIENT
Start: 2021-06-26 | End: 2021-07-07 | Stop reason: SDUPTHER

## 2021-04-06 ASSESSMENT — FIBROSIS 4 INDEX: FIB4 SCORE: 0.65

## 2021-04-06 NOTE — PROGRESS NOTES
Telemedicine Visit: Established Patient     This encounter was conducted via Zoom.   Verbal consent was obtained. Patient's identity was verified.    Subjective:     Chief Complaint   Patient presents with   • Lab Results     Guerda Cavazos is a 55 y.o. female presenting for evaluation and management of following problems:    Insomnia  Chronic problem.  This is well controlled on Ambien 10 mg nightly.  Patient tried multiple other alternatives without success.  Sleep hygiene discussed.  She denies side effect such as drowsiness, disorientation, falls.  Controlled substance agreement signed on 1/14/2021.  PDMP checked with last refill noted on 3/27/2021, next refill is due on fourth 2021.    Dyslipidemia  Chronic problem.  This is well controlled on his of a statin 5 mg, tolerating well.  Last available lipid profile from June 2020 WNL.  Patient denies CP, dyspnea, dizziness, peripheral edema.  Labs are pending.  Patient will complete prior to next visit in July.      ROS   Denies any recent fevers or chills. No nausea or vomiting. No chest pains or shortness of breath.     Allergies   Allergen Reactions   • Other Food Itching and Swelling     Coconut and peanut  RXN=ongoing   • Coconut Fatty Acids      Coconut causes throat swelling per RN.    • Peanut-Derived      Peanuts cause throat swelling.        Current medicines (including changes today)  Current Outpatient Medications   Medication Sig Dispense Refill   • losartan (COZAAR) 25 MG Tab      • [START ON 6/26/2021] zolpidem (AMBIEN) 10 MG Tab Take 1 tablet by mouth at bedtime as needed for Sleep for up to 30 days. 30 tablet 2   • rosuvastatin (CRESTOR) 5 MG Tab Take 1 tablet by mouth at bedtime as needed. 90 tablet 3   • ESTRADIOL PO Take  by mouth.     • vitamin D, Ergocalciferol, (DRISDOL) 1.25 MG (48898 UT) Cap capsule Take 1 Cap by mouth every 7 days. 12 Cap 3   • Meloxicam 5 MG Cap Take 5 mg by mouth every day.     • progesterone (PROMETRIUM) 200 MG  capsule Take 200 mg by mouth.     • Erenumab (AIMOVIG 140 DOSE) 70 MG/ML Solution Auto-injector Inject 70 mg as instructed Once.     • cyclosporin (RESTASIS) 0.05 % ophthalmic emulsion Place 1 Drop in both eyes 2 times a day.     • ondansetron (ZOFRAN ODT) 4 MG TABLET DISPERSIBLE Take 1 Tab by mouth every 8 hours as needed. 30 Tab 3   • mycophenolate (CELLCEPT) 250 MG Cap      • CAMPBELL 0.05 MG/24HR PATCH BIWEEKLY 0.05 mg by Apply externally route every day.       No current facility-administered medications for this visit.       Patient Active Problem List    Diagnosis Date Noted   • Vitamin D deficiency 07/21/2020   • Establishing care with new doctor, encounter for 04/08/2020   • Dyslipidemia 04/08/2020   • Nocturia 04/26/2019   • Hx of transient ischemic attack (TIA) 04/18/2019   • Insomnia 04/18/2019   • Connective tissue disease, undifferentiated (HCC) 04/18/2019   • Benign familial tremor 03/16/2017   • Ocular proptosis 03/16/2017   • Migraine without aura and without status migrainosus, not intractable 03/16/2017   • Degenerative cervical disc 04/05/2016       Family History   Problem Relation Age of Onset   • Hypertension Mother    • Cancer Mother 50        breast cancer   • Cancer Father 50        Prostate cancer   • Hypertension Brother    • Diabetes Brother    • Heart Disease Paternal Grandfather         MI 70s   • Hypertension Brother        She  has a past medical history of Bowel habit changes, Connective tissue disease, undifferentiated (HCC) (2017), Dyslipidemia (4/8/2020), Migraine without aura and without status migrainosus, not intractable (3/16/2017), Pain, and Stroke (HCC) (2010). She also has no past medical history of Head ache or Hypertension.  She  has a past surgical history that includes gyn surgery (2010); other neurological surg (03/30/2015); other neurological surg (12/15); cervical disk and fusion anterior (N/A, 4/5/2016); abdominal exploration; cholecystectomy; and appendectomy.        "Objective:   Vitals obtained by patient:  Ht 1.727 m (5' 8\")   Wt 71.2 kg (157 lb)   LMP  (LMP Unknown)   BMI 23.87 kg/m²      Physical Exam:  General: No acute distress. Well nourished.   HEENT: EOM grossly intact, no scleral icterus, no pharyngeal erythema.   Neck:  No JVD noted at 90 degrees, trachea midline  CVS: Pulse as reported by patient, no visible LE edema.  Resp: Unlabored respiratory effort, no cough or audible wheeze  MSK/Ext: No clubbing or cyanosis visible appreciated.  Skin: No rashes in visible areas.  Neurological: AOx3. CN III-XII grossly intact. No focal deficits.     LABS: 4/2021  results reviewed and discussed with the patient, questions answered.    My total time spent caring for the patient on the day of the encounter was 15 minutes.   This does not include time spent on separately billable procedures/tests.   Assessment and Plan:   1. Hx of transient ischemic attack (TIA)  Stable on current regimen.  Continue.  Refills given.  Will obtain pending lipid profile.  Patient is aware to complete her labs prior to her next appointment.  - rosuvastatin (CRESTOR) 5 MG Tab; Take 1 tablet by mouth at bedtime as needed.  Dispense: 90 tablet; Refill: 3    2. Dyslipidemia  Controlled on current regimen.  Continue.  Refills given.  As discussed in 1  - rosuvastatin (CRESTOR) 5 MG Tab; Take 1 tablet by mouth at bedtime as needed.  Dispense: 90 tablet; Refill: 3    3. Insomnia, unspecified type  Stable on current regimen.  Continue.  Refills given.  Patient understands this prescription is a controlled substance which is potentially habit-forming and its use is regulated by the [employer].  Refills are subject to terms of a controlled substance agreement and patient has an updated one on file. Any refill requires an office visit. This medicine can cause sedation, confusion and accidental overdose.  PDMP reviewed. Controlled substance agreement signed.    This patient is continuing to use a controlled substance " (CS) on a long term basis.  The patient is thoroughly aware of the goals of treatment with the CS  The patient is aware that yearly and random urine drug screens are required.  The patient has been instructed to take the CS only as prescribed.  The patient is prohibited from sharing the CS with any other person.  The patient is instructed to inform the provider if any other CS is taken, of any alcohol or cannabis or other recreational drug use, any treatment for side effects of the CS or complications, if they have CS active rx in other states  The patient has evidence for a reason for the CS  The treatment plan has been discussed with the patient  The  report has been reviewed     - zolpidem (AMBIEN) 10 MG Tab; Take 1 tablet by mouth at bedtime as needed for Sleep for up to 30 days.  Dispense: 30 tablet; Refill: 2       Follow-up: No follow-ups on file.

## 2021-04-07 LAB — 25(OH)D3 SERPL-MCNC: 59 NG/ML (ref 30–80)

## 2021-04-23 ENCOUNTER — HOSPITAL ENCOUNTER (OUTPATIENT)
Dept: LAB | Facility: MEDICAL CENTER | Age: 56
End: 2021-04-23
Attending: NURSE PRACTITIONER
Payer: COMMERCIAL

## 2021-04-23 DIAGNOSIS — Z00.00 ANNUAL PHYSICAL EXAM: ICD-10-CM

## 2021-04-23 DIAGNOSIS — E55.9 VITAMIN D DEFICIENCY: ICD-10-CM

## 2021-04-23 PROCEDURE — 80061 LIPID PANEL: CPT

## 2021-04-23 PROCEDURE — 85025 COMPLETE CBC W/AUTO DIFF WBC: CPT

## 2021-04-23 PROCEDURE — 80053 COMPREHEN METABOLIC PANEL: CPT

## 2021-04-23 PROCEDURE — 82306 VITAMIN D 25 HYDROXY: CPT

## 2021-04-23 PROCEDURE — 36415 COLL VENOUS BLD VENIPUNCTURE: CPT

## 2021-04-24 LAB
ALBUMIN SERPL BCP-MCNC: 4.1 G/DL (ref 3.2–4.9)
ALBUMIN/GLOB SERPL: 1.5 G/DL
ALP SERPL-CCNC: 48 U/L (ref 30–99)
ALT SERPL-CCNC: 10 U/L (ref 2–50)
ANION GAP SERPL CALC-SCNC: 9 MMOL/L (ref 7–16)
AST SERPL-CCNC: 15 U/L (ref 12–45)
BASOPHILS # BLD AUTO: 1.2 % (ref 0–1.8)
BASOPHILS # BLD: 0.07 K/UL (ref 0–0.12)
BILIRUB SERPL-MCNC: 0.6 MG/DL (ref 0.1–1.5)
BUN SERPL-MCNC: 11 MG/DL (ref 8–22)
CALCIUM SERPL-MCNC: 9.7 MG/DL (ref 8.5–10.5)
CHLORIDE SERPL-SCNC: 107 MMOL/L (ref 96–112)
CHOLEST SERPL-MCNC: 119 MG/DL (ref 100–199)
CO2 SERPL-SCNC: 24 MMOL/L (ref 20–33)
CREAT SERPL-MCNC: 0.86 MG/DL (ref 0.5–1.4)
EOSINOPHIL # BLD AUTO: 0.17 K/UL (ref 0–0.51)
EOSINOPHIL NFR BLD: 3 % (ref 0–6.9)
ERYTHROCYTE [DISTWIDTH] IN BLOOD BY AUTOMATED COUNT: 46 FL (ref 35.9–50)
FASTING STATUS PATIENT QL REPORTED: NORMAL
GLOBULIN SER CALC-MCNC: 2.8 G/DL (ref 1.9–3.5)
GLUCOSE SERPL-MCNC: 68 MG/DL (ref 65–99)
HCT VFR BLD AUTO: 44.3 % (ref 37–47)
HDLC SERPL-MCNC: 46 MG/DL
HGB BLD-MCNC: 14.3 G/DL (ref 12–16)
IMM GRANULOCYTES # BLD AUTO: 0.01 K/UL (ref 0–0.11)
IMM GRANULOCYTES NFR BLD AUTO: 0.2 % (ref 0–0.9)
LDLC SERPL CALC-MCNC: 61 MG/DL
LYMPHOCYTES # BLD AUTO: 1.74 K/UL (ref 1–4.8)
LYMPHOCYTES NFR BLD: 30.8 % (ref 22–41)
MCH RBC QN AUTO: 28.1 PG (ref 27–33)
MCHC RBC AUTO-ENTMCNC: 32.3 G/DL (ref 33.6–35)
MCV RBC AUTO: 87 FL (ref 81.4–97.8)
MONOCYTES # BLD AUTO: 0.49 K/UL (ref 0–0.85)
MONOCYTES NFR BLD AUTO: 8.7 % (ref 0–13.4)
NEUTROPHILS # BLD AUTO: 3.17 K/UL (ref 2–7.15)
NEUTROPHILS NFR BLD: 56.1 % (ref 44–72)
NRBC # BLD AUTO: 0 K/UL
NRBC BLD-RTO: 0 /100 WBC
PLATELET # BLD AUTO: 356 K/UL (ref 164–446)
PMV BLD AUTO: 10.6 FL (ref 9–12.9)
POTASSIUM SERPL-SCNC: 4.4 MMOL/L (ref 3.6–5.5)
PROT SERPL-MCNC: 6.9 G/DL (ref 6–8.2)
RBC # BLD AUTO: 5.09 M/UL (ref 4.2–5.4)
SODIUM SERPL-SCNC: 140 MMOL/L (ref 135–145)
TRIGL SERPL-MCNC: 58 MG/DL (ref 0–149)
WBC # BLD AUTO: 5.7 K/UL (ref 4.8–10.8)

## 2021-04-25 LAB — 25(OH)D3 SERPL-MCNC: 63 NG/ML (ref 30–80)

## 2021-06-18 ENCOUNTER — TELEPHONE (OUTPATIENT)
Dept: MEDICAL GROUP | Facility: PHYSICIAN GROUP | Age: 56
End: 2021-06-18

## 2021-06-18 NOTE — TELEPHONE ENCOUNTER
Patient called and stated that she had TIA like symptoms compared to her TIA in 2010. Patient instructed to go to the ER for TIA/Stroke r/o.

## 2021-06-21 ENCOUNTER — HOSPITAL ENCOUNTER (OUTPATIENT)
Facility: MEDICAL CENTER | Age: 56
End: 2021-06-21
Attending: FAMILY MEDICINE
Payer: COMMERCIAL

## 2021-06-21 ENCOUNTER — OFFICE VISIT (OUTPATIENT)
Dept: URGENT CARE | Facility: CLINIC | Age: 56
End: 2021-06-21
Payer: COMMERCIAL

## 2021-06-21 VITALS
HEIGHT: 68 IN | BODY MASS INDEX: 23.25 KG/M2 | HEART RATE: 64 BPM | WEIGHT: 153.4 LBS | SYSTOLIC BLOOD PRESSURE: 118 MMHG | TEMPERATURE: 98.2 F | OXYGEN SATURATION: 99 % | RESPIRATION RATE: 16 BRPM | DIASTOLIC BLOOD PRESSURE: 78 MMHG

## 2021-06-21 DIAGNOSIS — N30.00 ACUTE CYSTITIS WITHOUT HEMATURIA: ICD-10-CM

## 2021-06-21 PROBLEM — H35.069 RETINAL VASCULITIS: Status: ACTIVE | Noted: 2020-12-22

## 2021-06-21 PROBLEM — H57.89 THYROID EYE DISEASE: Status: ACTIVE | Noted: 2020-01-01

## 2021-06-21 PROBLEM — D89.0 POLYCLONAL HYPERGAMMAGLOBULINEMIA: Status: ACTIVE | Noted: 2020-12-22

## 2021-06-21 PROBLEM — E07.9 THYROID EYE DISEASE: Status: ACTIVE | Noted: 2020-01-01

## 2021-06-21 PROBLEM — K58.9 IRRITABLE BOWEL SYNDROME: Status: ACTIVE | Noted: 2020-12-22

## 2021-06-21 LAB

## 2021-06-21 PROCEDURE — 87086 URINE CULTURE/COLONY COUNT: CPT

## 2021-06-21 PROCEDURE — 81002 URINALYSIS NONAUTO W/O SCOPE: CPT | Performed by: FAMILY MEDICINE

## 2021-06-21 PROCEDURE — 99213 OFFICE O/P EST LOW 20 MIN: CPT | Performed by: FAMILY MEDICINE

## 2021-06-21 RX ORDER — ERENUMAB-AOOE 140 MG/ML
INJECTION, SOLUTION SUBCUTANEOUS
COMMUNITY

## 2021-06-21 RX ORDER — ERENUMAB-AOOE 140 MG/ML
INJECTION, SOLUTION SUBCUTANEOUS
COMMUNITY
Start: 2021-04-22 | End: 2021-07-01

## 2021-06-21 RX ORDER — CARBOXYMETHYLCELLULOSE SODIUM 10 MG/ML
GEL OPHTHALMIC
COMMUNITY

## 2021-06-21 RX ORDER — POLYETHYLENE GLYCOL 400 AND PROPYLENE GLYCOL 4; 3 MG/ML; MG/ML
GEL OPHTHALMIC
COMMUNITY
End: 2021-07-01

## 2021-06-21 RX ORDER — ONDANSETRON 4 MG/1
TABLET, FILM COATED ORAL
COMMUNITY
End: 2021-07-01

## 2021-06-21 RX ORDER — CEFDINIR 300 MG/1
300 CAPSULE ORAL EVERY 12 HOURS
Qty: 10 CAPSULE | Refills: 0 | Status: SHIPPED | OUTPATIENT
Start: 2021-06-21 | End: 2021-06-26

## 2021-06-21 RX ORDER — FOLIC ACID 1 MG/1
TABLET ORAL
COMMUNITY
Start: 2021-04-06 | End: 2021-08-26

## 2021-06-21 RX ORDER — MELOXICAM 7.5 MG/1
TABLET ORAL
COMMUNITY
Start: 2021-03-08 | End: 2021-06-21

## 2021-06-21 RX ORDER — LOSARTAN POTASSIUM 25 MG/1
50 TABLET ORAL DAILY
COMMUNITY
Start: 2021-05-18 | End: 2022-04-14

## 2021-06-21 RX ORDER — ERGOCALCIFEROL 1.25 MG/1
CAPSULE ORAL
COMMUNITY
End: 2021-07-15

## 2021-06-21 RX ORDER — ROSUVASTATIN CALCIUM 5 MG/1
TABLET, COATED ORAL
COMMUNITY
End: 2021-07-01

## 2021-06-21 ASSESSMENT — ENCOUNTER SYMPTOMS
HEADACHES: 1
NAUSEA: 1
SORE THROAT: 0
COUGH: 0
FEVER: 0

## 2021-06-21 ASSESSMENT — FIBROSIS 4 INDEX: FIB4 SCORE: 0.75

## 2021-06-21 NOTE — PROGRESS NOTES
Subjective:     Guerda Cavazos is a 55 y.o. female who presents for Urinary Frequency (nausea, fatigue,headache x 1 week )    HPI  Pt presents for evaluation of an acute problem  Patient with urinary frequency and fatigue for the past 1 week  Feels she is urinating fairly normal amount, however feels she is urinating quite a bit more times a day than usual and with some urgency  Has been feeling vaguely ill with some nausea, fatigue, headaches    She felt very nauseated when she had a TIA many years ago  Pt saw her neurologist today and did not feel this was related to TIA or other neurologic problem     Review of Systems   Constitutional: Positive for malaise/fatigue. Negative for fever.   HENT: Negative for sore throat.    Respiratory: Negative for cough.    Gastrointestinal: Positive for nausea.   Skin: Negative for rash.   Neurological: Positive for headaches.       PMH:  has a past medical history of Bowel habit changes, Connective tissue disease, undifferentiated ([facility]) (2017), Dyslipidemia (4/8/2020), Migraine without aura and without status migrainosus, not intractable (3/16/2017), Pain, and Stroke ([facility]) (2010). She also has no past medical history of Head ache or Hypertension.  MEDS:   Current Outpatient Medications:   •  Carboxymethylcellulose (REFRESH) 1 % Gel, 1 gtt in each eye 2 times a day for 30 day(s), Disp: , Rfl:   •  folic acid (FOLVITE) 1 MG Tab, , Disp: , Rfl:   •  ondansetron (ZOFRAN) 4 MG Tab tablet, 1 tab(s) orally prn, Disp: , Rfl:   •  Polyethyl Glycol-Propyl Glycol (SYSTANE) 0.4-0.3 % Gel, 1 gtt in each eye 2 times a day for 30 day(s), Disp: , Rfl:   •  Erenumab-aooe (AIMOVIG) 140 MG/ML Solution Auto-injector, as directed subcutaneously once a month, Disp: , Rfl:   •  AIMOVIG 140 MG/ML Solution Auto-injector, , Disp: , Rfl:   •  ergocalciferol (DRISDOL) 16540 UNIT capsule, 1 tablet orally once a week, Disp: , Rfl:   •  losartan (COZAAR) 25 MG Tab, Take 50 mg by mouth every day., Disp: ,  Rfl:   •  rosuvastatin (CRESTOR) 5 MG Tab, 1 tab(s) orally once a day (at bedtime) for 30 day(s), Disp: , Rfl:   •  Multiple Vitamins-Minerals (ZINC PO), Take  by mouth., Disp: , Rfl:   •  losartan (COZAAR) 25 MG Tab, , Disp: , Rfl:   •  [START ON 6/26/2021] zolpidem (AMBIEN) 10 MG Tab, Take 1 tablet by mouth at bedtime as needed for Sleep for up to 30 days., Disp: 30 tablet, Rfl: 2  •  rosuvastatin (CRESTOR) 5 MG Tab, Take 1 tablet by mouth at bedtime as needed., Disp: 90 tablet, Rfl: 3  •  ESTRADIOL PO, Take  by mouth., Disp: , Rfl:   •  vitamin D, Ergocalciferol, (DRISDOL) 1.25 MG (94315 UT) Cap capsule, Take 1 Cap by mouth every 7 days., Disp: 12 Cap, Rfl: 3  •  Meloxicam 5 MG Cap, Take 5 mg by mouth every day., Disp: , Rfl:   •  progesterone (PROMETRIUM) 200 MG capsule, Take 200 mg by mouth., Disp: , Rfl:   •  Erenumab (AIMOVIG 140 DOSE) 70 MG/ML Solution Auto-injector, Inject 70 mg as instructed Once., Disp: , Rfl:   •  cyclosporin (RESTASIS) 0.05 % ophthalmic emulsion, Place 1 Drop in both eyes 2 times a day., Disp: , Rfl:   •  ondansetron (ZOFRAN ODT) 4 MG TABLET DISPERSIBLE, Take 1 Tab by mouth every 8 hours as needed., Disp: 30 Tab, Rfl: 3  •  mycophenolate (CELLCEPT) 250 MG Cap, , Disp: , Rfl:   •  progesterone (ENDOMETRIN) 100 MG vaginal insert, as directed intravaginally once a day for 10 week(s) (Patient not taking: Reported on 6/21/2021), Disp: , Rfl:   •  meloxicam (MOBIC) 7.5 MG Tab, , Disp: , Rfl:   •  CAMPBELL 0.05 MG/24HR PATCH BIWEEKLY, 0.05 mg by Apply externally route every day. (Patient not taking: Reported on 6/21/2021), Disp: , Rfl:   ALLERGIES:   Allergies   Allergen Reactions   • Other Food Itching and Swelling     Coconut and peanut  RXN=ongoing   • Coconut Fatty Acids Swelling     Coconut causes throat swelling per RN.   Coconut causes throat swelling per RN.    • Coconut Oil Unspecified and Swelling     throat swelling  throat swelling   • Peanut-Derived Unspecified     Peanuts cause  "throat swelling.   Peanuts cause throat swelling.    • Prednisolone Unspecified     \"Burning eyes,had to be flushed out.\"     SURGHX:   Past Surgical History:   Procedure Laterality Date   • CERVICAL DISK AND FUSION ANTERIOR N/A 4/5/2016    Procedure: CERVICAL DISK AND FUSION ANTERIOR C4-5 WITH C5-7 PLATE REMOVAL ;  Surgeon: Chuck Kate M.D.;  Location: SURGERY [facility];  Service:    • OTHER NEUROLOGICAL SURG  12/15    Radio Frequency Ablations on neck   • OTHER NEUROLOGICAL SURG  03/30/2015    anterior cervical fusion   • GYN SURGERY  2010    fibroids, endometriosis   • ABDOMINAL EXPLORATION      umbilical hernia infant   • APPENDECTOMY     • CHOLECYSTECTOMY       SOCHX:  reports that she quit smoking about 7 years ago. Her smoking use included cigarettes. She has a 7.50 pack-year smoking history. She has never used smokeless tobacco. She reports current alcohol use of about 0.6 oz of alcohol per week. She reports that she does not use drugs.     Objective:   /78 (BP Location: Right arm, Patient Position: Sitting)   Pulse 64   Temp 36.8 °C (98.2 °F) (Temporal)   Resp 16   Ht 1.727 m (5' 8\")   Wt 69.6 kg (153 lb 6.4 oz)   LMP  (LMP Unknown)   SpO2 99%   BMI 23.32 kg/m²     Physical Exam  Constitutional:       General: She is not in acute distress.     Appearance: She is well-developed. She is not diaphoretic.   HENT:      Head: Normocephalic and atraumatic.      Right Ear: Tympanic membrane, ear canal and external ear normal.      Left Ear: Tympanic membrane, ear canal and external ear normal.      Nose: Nose normal.      Mouth/Throat:      Mouth: Mucous membranes are moist.      Pharynx: Oropharynx is clear. No oropharyngeal exudate or posterior oropharyngeal erythema.   Neck:      Trachea: No tracheal deviation.   Cardiovascular:      Rate and Rhythm: Normal rate and regular rhythm.   Pulmonary:      Effort: Pulmonary effort is normal. No respiratory distress.      Breath sounds: Normal " breath sounds. No wheezing or rales.   Abdominal:      General: Abdomen is flat. Bowel sounds are normal. There is no distension.      Palpations: Abdomen is soft.      Tenderness: There is no right CVA tenderness, left CVA tenderness, guarding or rebound.      Comments: +Mild suprapubic tenderness    Skin:     General: Skin is warm and dry.      Findings: No erythema or rash.   Neurological:      Mental Status: She is alert.         Assessment/Plan:   Assessment    1. Acute cystitis without hematuria  - POCT Urinalysis  - Urine Culture; Future  - cefdinir (OMNICEF) 300 MG Cap; Take 1 capsule by mouth every 12 hours for 5 days.  Dispense: 10 capsule; Refill: 0    Patient with small leukocyte esterase on point-of-care urine and mild lower abdominal tenderness.  Will treat with cefdinir for acute cystitis.  Given close follow-up precautions if not making rapid improvements over the next few days.  Patient agreeable to treatment plan and will follow up in the urgent care on an as-needed basis.

## 2021-06-22 DIAGNOSIS — N30.00 ACUTE CYSTITIS WITHOUT HEMATURIA: ICD-10-CM

## 2021-06-23 ENCOUNTER — PATIENT MESSAGE (OUTPATIENT)
Dept: MEDICAL GROUP | Facility: CLINIC | Age: 56
End: 2021-06-23

## 2021-06-23 DIAGNOSIS — R10.30 LOWER ABDOMINAL PAIN: ICD-10-CM

## 2021-06-23 DIAGNOSIS — R11.0 NAUSEA: ICD-10-CM

## 2021-06-23 NOTE — PROGRESS NOTES
Not making much improvement, although also not worsening.  Urine culture still pending.  Because of her continued nausea and loss of appetite, did recommend some further evaluation with blood testing to ensure there are not other missing things that need to be addressed.  She is already on a stool softener to improve constipation and already taking Omnicef for presumed cystitis.  Will call back after urine culture resulted and labs complete.

## 2021-06-24 ENCOUNTER — HOSPITAL ENCOUNTER (OUTPATIENT)
Dept: LAB | Facility: MEDICAL CENTER | Age: 56
End: 2021-06-24
Attending: FAMILY MEDICINE
Payer: COMMERCIAL

## 2021-06-24 DIAGNOSIS — R10.30 LOWER ABDOMINAL PAIN: ICD-10-CM

## 2021-06-24 DIAGNOSIS — R11.0 NAUSEA: ICD-10-CM

## 2021-06-24 LAB
BACTERIA UR CULT: NORMAL
SIGNIFICANT IND 70042: NORMAL
SITE SITE: NORMAL
SOURCE SOURCE: NORMAL

## 2021-06-28 ENCOUNTER — HOSPITAL ENCOUNTER (OUTPATIENT)
Dept: LAB | Facility: MEDICAL CENTER | Age: 56
End: 2021-06-28
Attending: NURSE PRACTITIONER
Payer: COMMERCIAL

## 2021-06-28 PROCEDURE — 82306 VITAMIN D 25 HYDROXY: CPT

## 2021-06-28 PROCEDURE — 80053 COMPREHEN METABOLIC PANEL: CPT

## 2021-06-28 PROCEDURE — 85025 COMPLETE CBC W/AUTO DIFF WBC: CPT

## 2021-06-28 PROCEDURE — 36415 COLL VENOUS BLD VENIPUNCTURE: CPT

## 2021-06-29 LAB
25(OH)D3 SERPL-MCNC: 41 NG/ML (ref 30–100)
ALBUMIN SERPL BCP-MCNC: 4.4 G/DL (ref 3.2–4.9)
ALBUMIN/GLOB SERPL: 1.7 G/DL
ALP SERPL-CCNC: 46 U/L (ref 30–99)
ALT SERPL-CCNC: 11 U/L (ref 2–50)
ANION GAP SERPL CALC-SCNC: 12 MMOL/L (ref 7–16)
AST SERPL-CCNC: 20 U/L (ref 12–45)
BASOPHILS # BLD AUTO: 1.1 % (ref 0–1.8)
BASOPHILS # BLD: 0.05 K/UL (ref 0–0.12)
BILIRUB SERPL-MCNC: 0.4 MG/DL (ref 0.1–1.5)
BUN SERPL-MCNC: 8 MG/DL (ref 8–22)
CALCIUM SERPL-MCNC: 8.9 MG/DL (ref 8.5–10.5)
CHLORIDE SERPL-SCNC: 108 MMOL/L (ref 96–112)
CO2 SERPL-SCNC: 22 MMOL/L (ref 20–33)
CREAT SERPL-MCNC: 0.87 MG/DL (ref 0.5–1.4)
EOSINOPHIL # BLD AUTO: 0.19 K/UL (ref 0–0.51)
EOSINOPHIL NFR BLD: 4.1 % (ref 0–6.9)
ERYTHROCYTE [DISTWIDTH] IN BLOOD BY AUTOMATED COUNT: 44.6 FL (ref 35.9–50)
GLOBULIN SER CALC-MCNC: 2.6 G/DL (ref 1.9–3.5)
GLUCOSE SERPL-MCNC: 83 MG/DL (ref 65–99)
HCT VFR BLD AUTO: 44.5 % (ref 37–47)
HGB BLD-MCNC: 14.4 G/DL (ref 12–16)
IMM GRANULOCYTES # BLD AUTO: 0.01 K/UL (ref 0–0.11)
IMM GRANULOCYTES NFR BLD AUTO: 0.2 % (ref 0–0.9)
LYMPHOCYTES # BLD AUTO: 1.75 K/UL (ref 1–4.8)
LYMPHOCYTES NFR BLD: 37.3 % (ref 22–41)
MCH RBC QN AUTO: 28.1 PG (ref 27–33)
MCHC RBC AUTO-ENTMCNC: 32.4 G/DL (ref 33.6–35)
MCV RBC AUTO: 86.9 FL (ref 81.4–97.8)
MONOCYTES # BLD AUTO: 0.34 K/UL (ref 0–0.85)
MONOCYTES NFR BLD AUTO: 7.2 % (ref 0–13.4)
NEUTROPHILS # BLD AUTO: 2.35 K/UL (ref 2–7.15)
NEUTROPHILS NFR BLD: 50.1 % (ref 44–72)
NRBC # BLD AUTO: 0 K/UL
NRBC BLD-RTO: 0 /100 WBC
PLATELET # BLD AUTO: 316 K/UL (ref 164–446)
PMV BLD AUTO: 10.7 FL (ref 9–12.9)
POTASSIUM SERPL-SCNC: 4.1 MMOL/L (ref 3.6–5.5)
PROT SERPL-MCNC: 7 G/DL (ref 6–8.2)
RBC # BLD AUTO: 5.12 M/UL (ref 4.2–5.4)
SODIUM SERPL-SCNC: 142 MMOL/L (ref 135–145)
WBC # BLD AUTO: 4.7 K/UL (ref 4.8–10.8)

## 2021-06-30 ENCOUNTER — HOSPITAL ENCOUNTER (OUTPATIENT)
Dept: LAB | Facility: MEDICAL CENTER | Age: 56
End: 2021-06-30
Attending: NURSE PRACTITIONER
Payer: COMMERCIAL

## 2021-06-30 PROCEDURE — 36415 COLL VENOUS BLD VENIPUNCTURE: CPT

## 2021-06-30 PROCEDURE — 80061 LIPID PANEL: CPT

## 2021-07-01 ENCOUNTER — OFFICE VISIT (OUTPATIENT)
Dept: MEDICAL GROUP | Facility: PHYSICIAN GROUP | Age: 56
End: 2021-07-01
Payer: COMMERCIAL

## 2021-07-01 ENCOUNTER — HOSPITAL ENCOUNTER (OUTPATIENT)
Facility: MEDICAL CENTER | Age: 56
End: 2021-07-01
Attending: NURSE PRACTITIONER
Payer: COMMERCIAL

## 2021-07-01 VITALS
TEMPERATURE: 98.7 F | WEIGHT: 152 LBS | HEIGHT: 68 IN | BODY MASS INDEX: 23.04 KG/M2 | HEART RATE: 95 BPM | SYSTOLIC BLOOD PRESSURE: 110 MMHG | DIASTOLIC BLOOD PRESSURE: 70 MMHG | OXYGEN SATURATION: 96 % | RESPIRATION RATE: 14 BRPM

## 2021-07-01 DIAGNOSIS — R14.0 ABDOMINAL BLOATING: ICD-10-CM

## 2021-07-01 DIAGNOSIS — R11.0 NAUSEA WITHOUT VOMITING: ICD-10-CM

## 2021-07-01 DIAGNOSIS — R35.0 URINE FREQUENCY: ICD-10-CM

## 2021-07-01 DIAGNOSIS — N30.90 CYSTITIS: ICD-10-CM

## 2021-07-01 LAB
APPEARANCE UR: CLEAR
BILIRUB UR STRIP-MCNC: NORMAL MG/DL
CHOLEST SERPL-MCNC: 119 MG/DL (ref 100–199)
COLOR UR AUTO: YELLOW
FASTING STATUS PATIENT QL REPORTED: NORMAL
GLUCOSE UR STRIP.AUTO-MCNC: NORMAL MG/DL
HDLC SERPL-MCNC: 46 MG/DL
KETONES UR STRIP.AUTO-MCNC: 15 MG/DL
LDLC SERPL CALC-MCNC: 64 MG/DL
LEUKOCYTE ESTERASE UR QL STRIP.AUTO: NORMAL
NITRITE UR QL STRIP.AUTO: NORMAL
PH UR STRIP.AUTO: 5.5 [PH] (ref 5–8)
PROT UR QL STRIP: NORMAL MG/DL
RBC UR QL AUTO: NORMAL
SP GR UR STRIP.AUTO: 1.02
TRIGL SERPL-MCNC: 46 MG/DL (ref 0–149)
UROBILINOGEN UR STRIP-MCNC: 0.2 MG/DL

## 2021-07-01 PROCEDURE — 81002 URINALYSIS NONAUTO W/O SCOPE: CPT | Performed by: NURSE PRACTITIONER

## 2021-07-01 PROCEDURE — 99214 OFFICE O/P EST MOD 30 MIN: CPT | Performed by: NURSE PRACTITIONER

## 2021-07-01 RX ORDER — SULFAMETHOXAZOLE AND TRIMETHOPRIM 800; 160 MG/1; MG/1
1 TABLET ORAL 2 TIMES DAILY
Qty: 10 TABLET | Refills: 0 | Status: SHIPPED | OUTPATIENT
Start: 2021-07-01 | End: 2021-07-06

## 2021-07-01 RX ORDER — ESTRADIOL 1 MG/1
TABLET ORAL
COMMUNITY
Start: 2021-05-19

## 2021-07-01 ASSESSMENT — FIBROSIS 4 INDEX: FIB4 SCORE: 1.07

## 2021-07-02 DIAGNOSIS — N30.90 CYSTITIS: ICD-10-CM

## 2021-07-02 DIAGNOSIS — R35.0 URINE FREQUENCY: ICD-10-CM

## 2021-07-02 NOTE — PROGRESS NOTES
Chief Complaint   Patient presents with   • Nausea   • Blurred Vision   • Lab Results       HISTORY OF PRESENT ILLNESS: Patient is a 55 y.o. female, established patient who presents today to discuss medical problems as listed below:    Health Maintenance:  COMPLETED     Cystitis  New problem.  Symptoms of abdominal bloating started 6/15/2021.  Associated symptoms are nausea without vomiting, migraines without aura.  Last night patient reports a new symptom of blurred vision lasting for about 25 minutes and resolved spontaneously, this was not associated with HA.  Patient has Hx of TIAs in 2010 which was back in [place].  She is not a smoker, not a diabetic, BP is WNL, recent lipid panel WNL from June 2021.  Patient reports recent ophthalmology evaluation 2 months ago without any changes.  She was seen and evaluated in urgent care on 6/21/2021 with POC UA with trace leuks and was treated with cefdinir completed regimen on 6/26/2021, tolerated well.  Her symptoms of nausea and abdominal bloating still persist.  She denies vaginal bleeding, no hematuria noted.  She is however on estradiol supplementation 1 mg p.o. she denies dysuria, fever, no CP or dyspnea, denies back of flank pain.      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   • Irritable bowel syndrome 12/22/2020   • Vitamin D deficiency 07/21/2020   • Establishing care with new doctor, encounter for 04/08/2020   • Dyslipidemia 04/08/2020   • Thyroid eye disease 01/01/2020   • Nocturia 04/26/2019   • Hx of transient ischemic attack (TIA) 04/18/2019   • Insomnia 04/18/2019   • Connective tissue disease, undifferentiated (HCC) 04/18/2019   • Benign familial tremor 03/16/2017   • Ocular proptosis 03/16/2017   • Migraine without aura and without status migrainosus, not intractable 03/16/2017   • Degenerative cervical disc 04/05/2016         Allergies: Other food, Coconut fatty acids, Coconut oil, Peanut-derived, and Prednisolone    Current Outpatient Medications   Medication Sig Dispense Refill   • estradiol (ESTRACE) 1 MG Tab      • sulfamethoxazole-trimethoprim (BACTRIM DS) 800-160 MG tablet Take 1 tablet by mouth 2 times a day for 5 days. 10 tablet 0   • Carboxymethylcellulose (REFRESH) 1 % Gel 1 gtt in each eye 2 times a day for 30 day(s)     • folic acid (FOLVITE) 1 MG Tab      • Erenumab-aooe (AIMOVIG) 140 MG/ML Solution Auto-injector as directed subcutaneously once a month     • ergocalciferol (DRISDOL) 82429 UNIT capsule 1 tablet orally once a week     • losartan (COZAAR) 25 MG Tab Take 50 mg by mouth every day.     • zolpidem (AMBIEN) 10 MG Tab Take 1 tablet by mouth at bedtime as needed for Sleep for up to 30 days. 30 tablet 2   • rosuvastatin (CRESTOR) 5 MG Tab Take 1 tablet by mouth at bedtime as needed. 90 tablet 3   • vitamin D, Ergocalciferol, (DRISDOL) 1.25 MG (97753 UT) Cap capsule Take 1 Cap by mouth every 7 days. 12 Cap 3   • Meloxicam 5 MG Cap Take 5 mg by mouth every day.     • progesterone (PROMETRIUM) 200 MG capsule Take 200 mg by mouth.     • cyclosporin (RESTASIS) 0.05 % ophthalmic emulsion Place 1 Drop in both eyes 2 times a day.     • ondansetron (ZOFRAN ODT) 4 MG TABLET DISPERSIBLE Take 1 Tab by mouth every 8 hours as needed. 30 Tab 3   • mycophenolate (CELLCEPT) 250 MG Cap        No current facility-administered medications for this visit.       Social History     Tobacco Use   • Smoking status: Former Smoker     Packs/day: 0.25     Years: 30.00     Pack years: 7.50     Types: Cigarettes     Quit date: 2014     Years since quittin.2   • Smokeless tobacco: Never Used   Vaping Use   • Vaping Use: Never used   Substance Use Topics   • Alcohol use: Yes     Alcohol/week: 0.6 oz     Types: 1 Glasses of wine per week     Comment: about 1 every other  month   • Drug use: No     Social History     Social History  "Narrative   • Not on file       Family History   Problem Relation Age of Onset   • Hypertension Mother    • Cancer Mother 50        breast cancer   • Cancer Father 50        Prostate cancer   • Hypertension Brother    • Diabetes Brother    • Heart Disease Paternal Grandfather         MI 70s   • Hypertension Brother        Allergies, past medical history, past surgical history, family history, social history reviewed and updated.    Review of Systems:     - Constitutional: Negative for fever, chills, unexpected weight change, and fatigue/generalized weakness.     - HEENT: Headaches and single episode of blurry vision which is resolved    - Respiratory: Negative for cough, sputum production, chest congestion, dyspnea, wheezing, and crackles.      - Cardiovascular: Negative for chest pain, palpitations, orthopnea, and bilateral lower extremity edema.     - Gastrointestinal: Abdominal bloating, nausea without vomiting.      - Genitourinary: Urinary frequency.      - Psychiatric/Behavioral: Negative for depression, suicidal/homicidal ideation and memory loss.      All other systems reviewed and are negative    Exam:    /70 (BP Location: Left arm, Patient Position: Sitting, BP Cuff Size: Adult)   Pulse 95   Temp 37.1 °C (98.7 °F) (Temporal)   Resp 14   Ht 1.727 m (5' 8\")   Wt 68.9 kg (152 lb)   LMP  (LMP Unknown)   SpO2 96%   BMI 23.11 kg/m²  Body mass index is 23.11 kg/m².    Physical Exam:  Constitutional: Well-developed and well-nourished. Not diaphoretic. No distress.   Cardiovascular: Regular rate and rhythm, S1 and S2 without murmur, rubs, or gallops.    Chest: Effort normal. Clear to auscultation throughout. No adventitious sounds. No CVA tenderness.  Abdomen: Soft, non tender, and without distention. Active bowel sounds in all four quadrants. No rebound, guarding, masses or HSM.  Neurological: Alert and oriented x 3.   Psychiatric:  Behavior, mood, and affect are appropriate.  MA/nursing note and " vitals reviewed.    LAbs 6/2021, UA POC  results reviewed and discussed with the patient, questions answered.    My total time spent caring for the patient on the day of the encounter was 25 minutes.   This does not include time spent on separately billable procedures/tests.     Assessment/Plan:  1. Cystitis  Uncontrolled, stable. Discussed UTI prevention strategies.  Avoid damp or tight clothing, avoid scented detergents, recommend wearing underwear with cotton lining. Increase hydration ( at least 6-8 glasses of water per day). Consume fermented foods (ex. greek yogurt, kefir, fermented veggies.), add OTC vit C and non-sweetened cranberry juice and/or PO cranberry supplement daily.  Also recommend OTC supplement D-Mannose.  Avoid simple carbohydrates, maintain good hygiene post voidal / post BM wiping technique ( front to back). If diabetic, strive to achieve goal A1C.  If sexually active, drink 1 to 2 glasses of water prior to coitus and void right after coitus.   - URINALYSIS,CULTURE IF INDICATED; Future  - POCT Urinalysis    2. Abdominal bloating  - US-ABDOMEN COMPLETE SURVEY; Future    3. Urine frequency  - POCT Urinalysis  - URINALYSIS,CULTURE IF INDICATED; Future  - POCT Urinalysis    4. Nausea without vomiting  Uncontrolled, stable.  Patient has Zofran as needed.       Discussed with patient possible alternative diagnoses, pt is to take all medications as prescribed. If symptoms persist FU w/PCP, if symptoms worsen go to emergency room. If experiencing any side effects from prescribed medications reports to the office immediately or go to emergency room.  Reviewed indication, dosage, usage and potential adverse effects of prescribed medications. Reviewed risks and benefits of treatment plan. Patient verbalizes understanding of all instruction and verbally agrees to plan.    No follow-ups on file. 2 weeks

## 2021-07-02 NOTE — ASSESSMENT & PLAN NOTE
New problem.  Symptoms of abdominal bloating started 6/15/2021.  Associated symptoms are nausea without vomiting, migraines without aura.  Last night patient reports a new symptom of blurred vision lasting for about 25 minutes and resolved spontaneously, this was not associated with HA.  Patient has Hx of TIAs in 2010 which was back in [place].  She is not a smoker, not a diabetic, BP is WNL, recent lipid panel WNL from June 2021.  Patient reports recent ophthalmology evaluation 2 months ago without any changes.  She was seen and evaluated in urgent care on 6/21/2021 with POC UA with trace leuks and was treated with cefdinir completed regimen on 6/26/2021, tolerated well.  Her symptoms of nausea and abdominal bloating still persist.  She denies vaginal bleeding, no hematuria noted.  She is however on estradiol supplementation 1 mg p.o. she denies dysuria, fever, no CP or dyspnea, denies back of flank pain.

## 2021-07-07 ENCOUNTER — TELEMEDICINE (OUTPATIENT)
Dept: MEDICAL GROUP | Facility: PHYSICIAN GROUP | Age: 56
End: 2021-07-07
Payer: COMMERCIAL

## 2021-07-07 VITALS — BODY MASS INDEX: 23.04 KG/M2 | HEIGHT: 68 IN | WEIGHT: 152 LBS

## 2021-07-07 DIAGNOSIS — G47.00 INSOMNIA, UNSPECIFIED TYPE: ICD-10-CM

## 2021-07-07 PROCEDURE — 99213 OFFICE O/P EST LOW 20 MIN: CPT | Mod: 95 | Performed by: NURSE PRACTITIONER

## 2021-07-07 RX ORDER — ZOLPIDEM TARTRATE 10 MG/1
10 TABLET ORAL NIGHTLY PRN
Qty: 30 TABLET | Refills: 0 | Status: SHIPPED
Start: 2021-09-23 | End: 2021-08-26

## 2021-07-07 RX ORDER — ZOLPIDEM TARTRATE 10 MG/1
10 TABLET ORAL NIGHTLY PRN
Qty: 30 TABLET | Refills: 0 | Status: SHIPPED
Start: 2021-08-24 | End: 2021-08-26

## 2021-07-07 RX ORDER — ZOLPIDEM TARTRATE 10 MG/1
10 TABLET ORAL NIGHTLY PRN
Qty: 30 TABLET | Refills: 0 | Status: SHIPPED | OUTPATIENT
Start: 2021-07-25 | End: 2021-08-23

## 2021-07-07 ASSESSMENT — FIBROSIS 4 INDEX: FIB4 SCORE: 1.07

## 2021-07-08 NOTE — PROGRESS NOTES
"Telemedicine Visit: Established Patient     This encounter was conducted via Zoom.   Verbal consent was obtained. Patient's identity was verified.    Subjective:   No chief complaint on file.    Guerda Cavazos is a 55 y.o. female presenting for evaluation and management of following problems:    Insomnia  Chronic problem.  This is well controlled on Ambien 10 mg nightly.  Patient denies side effects.  PDMP checked at last refill on 6/26/2021.  Controlled substance agreement signed 1/14/2021.      ROS   Denies any recent fevers or chills. No nausea or vomiting. No chest pains or shortness of breath.     Allergies   Allergen Reactions   • Other Food Itching and Swelling     Coconut and peanut  RXN=ongoing   • Coconut Fatty Acids Swelling     Coconut causes throat swelling per RN.   Coconut causes throat swelling per RN.    • Coconut Oil Unspecified and Swelling     throat swelling  throat swelling   • Peanut-Derived Unspecified     Peanuts cause throat swelling.   Peanuts cause throat swelling.    • Prednisolone Unspecified     \"Burning eyes,had to be flushed out.\"       Current medicines (including changes today)  Current Outpatient Medications   Medication Sig Dispense Refill   • [START ON 7/25/2021] zolpidem (AMBIEN) 10 MG Tab Take 1 tablet by mouth at bedtime as needed for Sleep for up to 30 days. 30 tablet 0   • [START ON 8/24/2021] zolpidem (AMBIEN) 10 MG Tab Take 1 tablet by mouth at bedtime as needed for Sleep for up to 30 days. 30 tablet 0   • [START ON 9/23/2021] zolpidem (AMBIEN) 10 MG Tab Take 1 tablet by mouth at bedtime as needed for Sleep for up to 30 days. 30 tablet 0   • estradiol (ESTRACE) 1 MG Tab      • Carboxymethylcellulose (REFRESH) 1 % Gel 1 gtt in each eye 2 times a day for 30 day(s)     • folic acid (FOLVITE) 1 MG Tab      • Erenumab-aooe (AIMOVIG) 140 MG/ML Solution Auto-injector as directed subcutaneously once a month     • ergocalciferol (DRISDOL) 66915 UNIT capsule 1 tablet orally once " a week     • losartan (COZAAR) 25 MG Tab Take 50 mg by mouth every day.     • rosuvastatin (CRESTOR) 5 MG Tab Take 1 tablet by mouth at bedtime as needed. 90 tablet 3   • vitamin D, Ergocalciferol, (DRISDOL) 1.25 MG (94273 UT) Cap capsule Take 1 Cap by mouth every 7 days. 12 Cap 3   • Meloxicam 5 MG Cap Take 5 mg by mouth every day.     • progesterone (PROMETRIUM) 200 MG capsule Take 200 mg by mouth.     • cyclosporin (RESTASIS) 0.05 % ophthalmic emulsion Place 1 Drop in both eyes 2 times a day.     • ondansetron (ZOFRAN ODT) 4 MG TABLET DISPERSIBLE Take 1 Tab by mouth every 8 hours as needed. 30 Tab 3     No current facility-administered medications for this visit.       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   • Irritable bowel syndrome 12/22/2020   • Vitamin D deficiency 07/21/2020   • Establishing care with new doctor, encounter for 04/08/2020   • Dyslipidemia 04/08/2020   • Thyroid eye disease 01/01/2020   • Nocturia 04/26/2019   • Hx of transient ischemic attack (TIA) 04/18/2019   • Insomnia 04/18/2019   • Connective tissue disease, undifferentiated (HCC) 04/18/2019   • Benign familial tremor 03/16/2017   • Ocular proptosis 03/16/2017   • Migraine without aura and without status migrainosus, not intractable 03/16/2017   • Degenerative cervical disc 04/05/2016       Family History   Problem Relation Age of Onset   • Hypertension Mother    • Cancer Mother 50        breast cancer   • Cancer Father 50        Prostate cancer   • Hypertension Brother    • Diabetes Brother    • Heart Disease Paternal Grandfather         MI 70s   • Hypertension Brother        She  has a past medical history of Bowel habit changes, Connective tissue disease, undifferentiated (HCC) (2017), Dyslipidemia (4/8/2020), Migraine without aura and without status migrainosus, not intractable (3/16/2017), Pain, and Stroke ([facility])  "(2010). She also has no past medical history of Head ache or Hypertension.  She  has a past surgical history that includes gyn surgery (2010); other neurological surg (03/30/2015); other neurological surg (12/15); cervical disk and fusion anterior (N/A, 4/5/2016); abdominal exploration; cholecystectomy; and appendectomy.       Objective:   Vitals obtained by patient:  Ht 1.727 m (5' 8\")   Wt 68.9 kg (152 lb)   LMP  (LMP Unknown)   BMI 23.11 kg/m²      Physical Exam:  General: No acute distress. Well nourished.   HEENT: EOM grossly intact, no scleral icterus, no pharyngeal erythema.   Neck:  No JVD noted at 90 degrees, trachea midline  CVS: Pulse as reported by patient, no visible LE edema.  Resp: Unlabored respiratory effort, no cough or audible wheeze  MSK/Ext: No clubbing or cyanosis visible appreciated.  Skin: No rashes in visible areas.  Neurological: AOx3. CN III-XII grossly intact. No focal deficits.       My total time spent caring for the patient on the day of the encounter was 15 minutes.   This does not include time spent on separately billable procedures/tests.   Assessment and Plan:   1. Insomnia, unspecified type  Stable on current regimen.  Continue  Patient understands this prescription is a controlled substance which is potentially habit-forming and its use is regulated by the [place].  Refills are subject to terms of a controlled substance agreement and patient has an updated one on file. Any refill requires an office visit. PDMP reviewed. Controlled substance agreement signed 1 /2021.    This patient is continuing to use a controlled substance (CS) on a long term basis.  The patient is thoroughly aware of the goals of treatment with the CS  The patient is aware that yearly and random urine drug screens are required.  The patient has been instructed to take the CS only as prescribed.  The patient is prohibited from sharing the CS with any other person.  The patient is instructed to inform the " provider if any other CS is taken, of any alcohol or cannabis or other recreational drug use, any treatment for side effects of the CS or complications, if they have CS active rx in other states  The patient has evidence for a reason for the CS  The treatment plan has been discussed with the patient  The  report has been reviewed     - zolpidem (AMBIEN) 10 MG Tab; Take 1 tablet by mouth at bedtime as needed for Sleep for up to 30 days.  Dispense: 30 tablet; Refill: 0  - zolpidem (AMBIEN) 10 MG Tab; Take 1 tablet by mouth at bedtime as needed for Sleep for up to 30 days.  Dispense: 30 tablet; Refill: 0  - zolpidem (AMBIEN) 10 MG Tab; Take 1 tablet by mouth at bedtime as needed for Sleep for up to 30 days.  Dispense: 30 tablet; Refill: 0       Follow-up: No follow-ups on file.

## 2021-07-08 NOTE — ASSESSMENT & PLAN NOTE
Chronic problem.  This is well controlled on Ambien 10 mg nightly.  Patient denies side effects.  PDMP checked at last refill on 6/26/2021.  Controlled substance agreement signed 1/14/2021.

## 2021-07-15 ENCOUNTER — OFFICE VISIT (OUTPATIENT)
Dept: MEDICAL GROUP | Facility: PHYSICIAN GROUP | Age: 56
End: 2021-07-15
Payer: COMMERCIAL

## 2021-07-15 VITALS
HEART RATE: 72 BPM | OXYGEN SATURATION: 98 % | DIASTOLIC BLOOD PRESSURE: 78 MMHG | HEIGHT: 68 IN | SYSTOLIC BLOOD PRESSURE: 122 MMHG | BODY MASS INDEX: 23.49 KG/M2 | WEIGHT: 155 LBS | RESPIRATION RATE: 12 BRPM | TEMPERATURE: 98.2 F

## 2021-07-15 DIAGNOSIS — N32.81 OVERACTIVE BLADDER: ICD-10-CM

## 2021-07-15 DIAGNOSIS — R14.0 ABDOMINAL BLOATING: ICD-10-CM

## 2021-07-15 PROCEDURE — 99213 OFFICE O/P EST LOW 20 MIN: CPT | Performed by: NURSE PRACTITIONER

## 2021-07-15 RX ORDER — FREMANEZUMAB-VFRM 225 MG/1.5ML
INJECTION SUBCUTANEOUS
COMMUNITY
End: 2023-03-01

## 2021-07-15 RX ORDER — MYCOPHENOLATE MOFETIL 500 MG/1
500 TABLET ORAL 2 TIMES DAILY
COMMUNITY
Start: 2021-07-07

## 2021-07-15 ASSESSMENT — FIBROSIS 4 INDEX: FIB4 SCORE: 1.07

## 2021-07-16 NOTE — ASSESSMENT & PLAN NOTE
This is a chronic problem for patient.  Predominant symptom is urinary frequency without associated symptoms such as dysuria, fevers, flank or lower back pain, no pelvic pain.  Recently treated for UTI, completed regimen.  Patient is status post menopause, on estradiol, followed by OB/GYN.  No Hx of frequent UTIs.

## 2021-07-16 NOTE — ASSESSMENT & PLAN NOTE
Chronic problem.  Patient reports being evaluated for IBS and gluten sensitivity in her early 20s.  Patient reports healthy diet in the last 5 to 7 years, trying to avoid gluten.  No constipation, diarrhea.  Recent abdominal complete ultrasound benign with incidental liver cyst which is benign,   Status post cholecystectomy.  Was hoping to visualize ovaries, will order transvaginal ultrasound.  Patient is followed by OB/GYN.

## 2021-07-16 NOTE — PROGRESS NOTES
Chief Complaint   Patient presents with   • Results     US abdomin       HISTORY OF PRESENT ILLNESS: Patient is a 55 y.o. female, established patient who presents today to discuss medical problems as listed below:    Health Maintenance:  COMPLETED     Overactive bladder  This is a chronic problem for patient.  Predominant symptom is urinary frequency without associated symptoms such as dysuria, fevers, flank or lower back pain, no pelvic pain.  Recently treated for UTI, completed regimen.  Patient is status post menopause, on estradiol, followed by OB/GYN.  No Hx of frequent UTIs.    Abdominal bloating  Chronic problem.  Patient reports being evaluated for IBS and gluten sensitivity in her early 20s.  Patient reports healthy diet in the last 5 to 7 years, trying to avoid gluten.  No constipation, diarrhea.  Recent abdominal complete ultrasound benign with incidental liver cyst which is benign,   Status post cholecystectomy.  Was hoping to visualize ovaries, will order transvaginal ultrasound.  Patient is followed by OB/GYN.      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   • Irritable bowel syndrome 12/22/2020   • Vitamin D deficiency 07/21/2020   • Establishing care with new doctor, encounter for 04/08/2020   • Dyslipidemia 04/08/2020   • Thyroid eye disease 01/01/2020   • Nocturia 04/26/2019   • Hx of transient ischemic attack (TIA) 04/18/2019   • Insomnia 04/18/2019   • Connective tissue disease, undifferentiated (HCC) 04/18/2019   • Benign familial tremor 03/16/2017   • Ocular proptosis 03/16/2017   • Migraine without aura and without status migrainosus, not intractable 03/16/2017   • Degenerative cervical disc 04/05/2016        Allergies: Other food, Coconut fatty acids, Coconut oil, Peanut-derived, and Prednisolone    Current  Outpatient Medications   Medication Sig Dispense Refill   • mycophenolate (CELLCEPT) 250 MG Cap      • Fremanezumab-vfrm (AJOVY) 225 MG/1.5ML Solution Auto-injector Inject  under the skin.     • Probiotic Product (FORTIFY OPTIMA PROBIOTIC PO) Take  by mouth.     • [START ON 2021] zolpidem (AMBIEN) 10 MG Tab Take 1 tablet by mouth at bedtime as needed for Sleep for up to 30 days. 30 tablet 0   • [START ON 2021] zolpidem (AMBIEN) 10 MG Tab Take 1 tablet by mouth at bedtime as needed for Sleep for up to 30 days. 30 tablet 0   • [START ON 2021] zolpidem (AMBIEN) 10 MG Tab Take 1 tablet by mouth at bedtime as needed for Sleep for up to 30 days. 30 tablet 0   • estradiol (ESTRACE) 1 MG Tab      • Carboxymethylcellulose (REFRESH) 1 % Gel 1 gtt in each eye 2 times a day for 30 day(s)     • folic acid (FOLVITE) 1 MG Tab      • Erenumab-aooe (AIMOVIG) 140 MG/ML Solution Auto-injector as directed subcutaneously once a month     • losartan (COZAAR) 25 MG Tab Take 50 mg by mouth every day.     • rosuvastatin (CRESTOR) 5 MG Tab Take 1 tablet by mouth at bedtime as needed. 90 tablet 3   • vitamin D, Ergocalciferol, (DRISDOL) 1.25 MG (30509 UT) Cap capsule Take 1 Cap by mouth every 7 days. 12 Cap 3   • Meloxicam 5 MG Cap Take 5 mg by mouth every day.     • progesterone (PROMETRIUM) 200 MG capsule Take 200 mg by mouth.     • cyclosporin (RESTASIS) 0.05 % ophthalmic emulsion Place 1 Drop in both eyes 2 times a day.     • ondansetron (ZOFRAN ODT) 4 MG TABLET DISPERSIBLE Take 1 Tab by mouth every 8 hours as needed. 30 Tab 3     No current facility-administered medications for this visit.       Social History     Tobacco Use   • Smoking status: Former Smoker     Packs/day: 0.25     Years: 30.00     Pack years: 7.50     Types: Cigarettes     Quit date: 2014     Years since quittin.2   • Smokeless tobacco: Never Used   Vaping Use   • Vaping Use: Never used   Substance Use Topics   • Alcohol use: Yes      "Alcohol/week: 0.6 oz     Types: 1 Glasses of wine per week     Comment: about 1 every other  month   • Drug use: No     Social History     Social History Narrative   • Not on file       Family History   Problem Relation Age of Onset   • Hypertension Mother    • Cancer Mother 50        breast cancer   • Cancer Father 50        Prostate cancer   • Hypertension Brother    • Diabetes Brother    • Heart Disease Paternal Grandfather         MI 70s   • Hypertension Brother        Allergies, past medical history, past surgical history, family history, social history reviewed and updated.    Review of Systems:     - Constitutional: Negative for fever, chills, unexpected weight change, and fatigue/generalized weakness.     - Respiratory: Negative for cough, sputum production, chest congestion, dyspnea, wheezing, and crackles.      - Cardiovascular: Negative for chest pain, palpitations, orthopnea, and bilateral lower extremity edema.     - Gastrointestinal: Bloating  - Genitourinary: Chronic urinary frequency    - Psychiatric/Behavioral: Negative for depression, suicidal/homicidal ideation and memory loss.      All other systems reviewed and are negative    Exam:    /78   Pulse 72   Temp 36.8 °C (98.2 °F) (Temporal)   Resp 12   Ht 1.727 m (5' 8\")   Wt 70.3 kg (155 lb)   LMP  (LMP Unknown)   SpO2 98%   BMI 23.57 kg/m²  Body mass index is 23.57 kg/m².    Physical Exam:  Constitutional: Well-developed and well-nourished. Not diaphoretic. No distress.   Cardiovascular: Regular rate and rhythm, S1 and S2 without murmur, rubs, or gallops.    Chest: Effort normal. Clear to auscultation throughout. No adventitious sounds. Abdomen: Soft, non tender, and without distention. Active bowel sounds in all four quadrants.   Neurological: Alert and oriented x 3.   Psychiatric:  Behavior, mood, and affect are appropriate.  MA/nursing note and vitals reviewed.    US abdominal  results reviewed and discussed with the patient, " questions answered.    My total time spent caring for the patient on the day of the encounter was 15 minutes.   This does not include time spent on separately billable procedures/tests.     Assessment/Plan:  1. Abdominal bloating  We will check for celiac.  Suggest to start eating gluten.  Patient to follow-up with GI for definitive BX.  We will also obtain transvaginal ultrasound to visualize ovaries.  - REFERRAL TO GASTROENTEROLOGY  - US-OB TRANSVAGINAL ONLY; Future  - CELIAC DISEASE AB PANEL; Future  - CELIAC DISEASE GENOTYPING; Future    2. Overactive bladder  Educated on potential etiology.  Trial of PT for pelvic floor training.  We will follow-up in 6 weeks for efficacy.  - REFERRAL TO PHYSICAL THERAPY       Discussed with patient possible alternative diagnoses, pt is to take all medications as prescribed. If symptoms persist FU w/PCP, if symptoms worsen go to emergency room. If experiencing any side effects from prescribed medications reports to the office immediately or go to emergency room.  Reviewed indication, dosage, usage and potential adverse effects of prescribed medications. Reviewed risks and benefits of treatment plan. Patient verbalizes understanding of all instruction and verbally agrees to plan.    No follow-ups on file. annual

## 2021-07-19 ENCOUNTER — TELEPHONE (OUTPATIENT)
Dept: MEDICAL GROUP | Facility: PHYSICIAN GROUP | Age: 56
End: 2021-07-19

## 2021-07-20 DIAGNOSIS — N32.81 OVERACTIVE BLADDER: ICD-10-CM

## 2021-07-20 DIAGNOSIS — R14.0 ABDOMINAL BLOATING: ICD-10-CM

## 2021-07-22 ENCOUNTER — HOSPITAL ENCOUNTER (OUTPATIENT)
Dept: LAB | Facility: MEDICAL CENTER | Age: 56
End: 2021-07-22
Attending: INTERNAL MEDICINE
Payer: COMMERCIAL

## 2021-07-22 ENCOUNTER — HOSPITAL ENCOUNTER (OUTPATIENT)
Dept: LAB | Facility: MEDICAL CENTER | Age: 56
End: 2021-07-22
Attending: NURSE PRACTITIONER
Payer: COMMERCIAL

## 2021-07-22 DIAGNOSIS — R14.0 ABDOMINAL BLOATING: ICD-10-CM

## 2021-07-22 LAB
BASOPHILS # BLD AUTO: 0.9 % (ref 0–1.8)
BASOPHILS # BLD: 0.06 K/UL (ref 0–0.12)
EOSINOPHIL # BLD AUTO: 0.23 K/UL (ref 0–0.51)
EOSINOPHIL NFR BLD: 3.6 % (ref 0–6.9)
ERYTHROCYTE [DISTWIDTH] IN BLOOD BY AUTOMATED COUNT: 46.1 FL (ref 35.9–50)
ERYTHROCYTE [SEDIMENTATION RATE] IN BLOOD BY WESTERGREN METHOD: 5 MM/HOUR (ref 0–25)
HCT VFR BLD AUTO: 43.4 % (ref 37–47)
HGB BLD-MCNC: 13.7 G/DL (ref 12–16)
IMM GRANULOCYTES # BLD AUTO: 0.01 K/UL (ref 0–0.11)
IMM GRANULOCYTES NFR BLD AUTO: 0.2 % (ref 0–0.9)
LYMPHOCYTES # BLD AUTO: 2.07 K/UL (ref 1–4.8)
LYMPHOCYTES NFR BLD: 32.4 % (ref 22–41)
MCH RBC QN AUTO: 28.2 PG (ref 27–33)
MCHC RBC AUTO-ENTMCNC: 31.6 G/DL (ref 33.6–35)
MCV RBC AUTO: 89.3 FL (ref 81.4–97.8)
MONOCYTES # BLD AUTO: 0.42 K/UL (ref 0–0.85)
MONOCYTES NFR BLD AUTO: 6.6 % (ref 0–13.4)
NEUTROPHILS # BLD AUTO: 3.6 K/UL (ref 2–7.15)
NEUTROPHILS NFR BLD: 56.3 % (ref 44–72)
NRBC # BLD AUTO: 0 K/UL
NRBC BLD-RTO: 0 /100 WBC
PLATELET # BLD AUTO: 328 K/UL (ref 164–446)
PMV BLD AUTO: 10.8 FL (ref 9–12.9)
RBC # BLD AUTO: 4.86 M/UL (ref 4.2–5.4)
WBC # BLD AUTO: 6.4 K/UL (ref 4.8–10.8)

## 2021-07-22 PROCEDURE — 81383 HLA II TYPING 1 ALLELE HR: CPT

## 2021-07-22 PROCEDURE — 81376 HLA II TYPING 1 LOCUS LR: CPT | Mod: 91

## 2021-07-22 PROCEDURE — 86160 COMPLEMENT ANTIGEN: CPT

## 2021-07-22 PROCEDURE — 85652 RBC SED RATE AUTOMATED: CPT

## 2021-07-22 PROCEDURE — 82784 ASSAY IGA/IGD/IGG/IGM EACH: CPT

## 2021-07-22 PROCEDURE — 80053 COMPREHEN METABOLIC PANEL: CPT

## 2021-07-22 PROCEDURE — 85025 COMPLETE CBC W/AUTO DIFF WBC: CPT

## 2021-07-22 PROCEDURE — 83516 IMMUNOASSAY NONANTIBODY: CPT

## 2021-07-22 PROCEDURE — 36415 COLL VENOUS BLD VENIPUNCTURE: CPT

## 2021-07-23 LAB
ALBUMIN SERPL BCP-MCNC: 4.2 G/DL (ref 3.2–4.9)
ALBUMIN/GLOB SERPL: 1.6 G/DL
ALP SERPL-CCNC: 45 U/L (ref 30–99)
ALT SERPL-CCNC: 11 U/L (ref 2–50)
ANION GAP SERPL CALC-SCNC: 11 MMOL/L (ref 7–16)
AST SERPL-CCNC: 18 U/L (ref 12–45)
BILIRUB SERPL-MCNC: 0.2 MG/DL (ref 0.1–1.5)
BUN SERPL-MCNC: 14 MG/DL (ref 8–22)
C3 SERPL-MCNC: 124.9 MG/DL (ref 87–200)
C4 SERPL-MCNC: 31.4 MG/DL (ref 19–52)
CALCIUM SERPL-MCNC: 8.9 MG/DL (ref 8.5–10.5)
CHLORIDE SERPL-SCNC: 110 MMOL/L (ref 96–112)
CO2 SERPL-SCNC: 24 MMOL/L (ref 20–33)
CREAT SERPL-MCNC: 0.87 MG/DL (ref 0.5–1.4)
FASTING STATUS PATIENT QL REPORTED: NORMAL
GLOBULIN SER CALC-MCNC: 2.6 G/DL (ref 1.9–3.5)
GLUCOSE SERPL-MCNC: 95 MG/DL (ref 65–99)
POTASSIUM SERPL-SCNC: 4.2 MMOL/L (ref 3.6–5.5)
PROT SERPL-MCNC: 6.8 G/DL (ref 6–8.2)
SODIUM SERPL-SCNC: 145 MMOL/L (ref 135–145)

## 2021-07-24 LAB
IGA SERPL-MCNC: 361 MG/DL (ref 68–408)
TTG IGA SER IA-ACNC: <2 U/ML (ref 0–3)

## 2021-07-27 LAB
CELIAS HLA INTERP Q0449: NORMAL
HLA CELIAC SPEC Q0445: NORMAL
HLA-DQA1*05:01 QL: NEGATIVE
HLA-DQB1*02:01 QL: NEGATIVE
HLA-DQB1*03:02 QL: NEGATIVE

## 2021-08-02 DIAGNOSIS — N32.81 OVERACTIVE BLADDER: ICD-10-CM

## 2021-08-02 NOTE — PROGRESS NOTES
Agata,    I have pended a referral to Urology for Guerda, you addressed her over active bladder at her visit on 7/15/21 and her Gyno suggested she get the referral from her PCP.    If you agree, please sign pended order.    Thanks,    Junior Ragsdale R.N.

## 2021-08-21 DIAGNOSIS — G47.00 INSOMNIA, UNSPECIFIED TYPE: ICD-10-CM

## 2021-08-24 RX ORDER — ZOLPIDEM TARTRATE 10 MG/1
TABLET ORAL
Qty: 30 TABLET | Refills: 0 | Status: SHIPPED | OUTPATIENT
Start: 2021-08-24 | End: 2021-09-23

## 2021-08-26 ENCOUNTER — OFFICE VISIT (OUTPATIENT)
Dept: MEDICAL GROUP | Facility: PHYSICIAN GROUP | Age: 56
End: 2021-08-26
Payer: COMMERCIAL

## 2021-08-26 VITALS
BODY MASS INDEX: 24.01 KG/M2 | TEMPERATURE: 98 F | SYSTOLIC BLOOD PRESSURE: 126 MMHG | HEIGHT: 68 IN | DIASTOLIC BLOOD PRESSURE: 84 MMHG | HEART RATE: 74 BPM | WEIGHT: 158.4 LBS | RESPIRATION RATE: 16 BRPM | OXYGEN SATURATION: 99 %

## 2021-08-26 DIAGNOSIS — R14.0 ABDOMINAL DISTENSION (GASEOUS): ICD-10-CM

## 2021-08-26 DIAGNOSIS — N80.129 ENDOMETRIOMA: ICD-10-CM

## 2021-08-26 DIAGNOSIS — R10.9 ABDOMINAL PAIN, UNSPECIFIED ABDOMINAL LOCATION: ICD-10-CM

## 2021-08-26 DIAGNOSIS — Z71.2 ENCOUNTER TO DISCUSS TEST RESULTS: ICD-10-CM

## 2021-08-26 PROBLEM — N39.41 URGE INCONTINENCE OF URINE: Status: ACTIVE | Noted: 2021-08-11

## 2021-08-26 PROBLEM — R39.15 URINARY URGENCY: Status: ACTIVE | Noted: 2021-08-11

## 2021-08-26 PROCEDURE — 99214 OFFICE O/P EST MOD 30 MIN: CPT | Performed by: NURSE PRACTITIONER

## 2021-08-26 RX ORDER — FOLIC ACID 1 MG/1
TABLET ORAL
COMMUNITY
Start: 2021-06-20 | End: 2023-03-01

## 2021-08-26 ASSESSMENT — FIBROSIS 4 INDEX: FIB4 SCORE: 0.93

## 2021-08-27 NOTE — ASSESSMENT & PLAN NOTE
Transvaginal ultrasound from 7/29/2021 reveals  incidental finding of 3.7 cm left ovarian mass endometrioma.    Patient denies breakthrough vaginal bleeding, she however reports chronic intermittent persistent abdominal pains last for 30 minutes with a frequency of 3 times a week, previously more frequently.  Patient was unable to identify triggers in foods.  Associated symptoms include nausea, denies.

## 2021-08-27 NOTE — PROGRESS NOTES
Chief Complaint   Patient presents with   • Results     labs and imaging        HISTORY OF PRESENT ILLNESS: Patient is a 55 y.o. female, established patient who presents today to discuss medical problems as listed below:    Health Maintenance:  COMPLETED     Endometrioma  Transvaginal ultrasound from 7/29/2021 reveals  incidental finding of 3.7 cm left ovarian mass endometrioma.    Patient denies breakthrough vaginal bleeding, she however reports chronic intermittent persistent abdominal pains last for 30 minutes with a frequency of 3 times a week, previously more frequently.  Patient was unable to identify triggers in foods.  Associated symptoms include nausea, denies.     Abdominal pain   chronic intermittent persistent abdominal pains last for 30 minutes with a frequency of 3 times a week, previously more frequently.  Patient was unable to identify triggers in foods.  Associated symptoms include nausea, denies.  Patient is scheduled to follow-up with GI in September and neurology as well.      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   • Irritable bowel syndrome 12/22/2020   • Vitamin D deficiency 07/21/2020   • Establishing care with new doctor, encounter for 04/08/2020   • Dyslipidemia 04/08/2020   • Thyroid eye disease 01/01/2020   • Nocturia 04/26/2019   • Hx of transient ischemic attack (TIA) 04/18/2019   • Insomnia 04/18/2019   • Connective tissue disease, undifferentiated (HCC) 04/18/2019   • Benign familial tremor 03/16/2017   • Ocular proptosis 03/16/2017   • Migraine without aura and without status migrainosus, not intractable 03/16/2017   • Degenerative cervical disc 04/05/2016         Allergies: Other food, Coconut fatty acids, Coconut oil, Peanut-derived, and Prednisolone    Current Outpatient Medications   Medication Sig Dispense Refill   • folic acid (FOLVITE) 1 MG Tab      • zolpidem (AMBIEN) 10 MG Tab TAKE ONE TABLET BY MOUTH AT BEDTIME AS NEEDED FOR SLEEP FOR UP TO 30 DAYS 30 Tablet 0   • mycophenolate (CELLCEPT) 500 MG tablet Take 500 mg by mouth 2 times a day.     • Fremanezumab-vfrm (AJOVY) 225 MG/1.5ML Solution Auto-injector Inject  under the skin.     • Probiotic Product (FORTIFY OPTIMA PROBIOTIC PO) Take  by mouth.     • estradiol (ESTRACE) 1 MG Tab      • Carboxymethylcellulose (REFRESH) 1 % Gel 1 gtt in each eye 2 times a day for 30 day(s)     • Erenumab-aooe (AIMOVIG) 140 MG/ML Solution Auto-injector as directed subcutaneously once a month     • losartan (COZAAR) 25 MG Tab Take 50 mg by mouth every day.     • rosuvastatin (CRESTOR) 5 MG Tab Take 1 tablet by mouth at bedtime as needed. 90 tablet 3   • vitamin D, Ergocalciferol, (DRISDOL) 1.25 MG (02228 UT) Cap capsule Take 1 Cap by mouth every 7 days. 12 Cap 3   • Meloxicam 5 MG Cap Take 5 mg by mouth every day.     • progesterone (PROMETRIUM) 200 MG capsule Take 200 mg by mouth.     • cyclosporin (RESTASIS) 0.05 % ophthalmic emulsion Place 1 Drop in both eyes 2 times a day.     • ondansetron (ZOFRAN ODT) 4 MG TABLET DISPERSIBLE Take 1 Tab by mouth every 8 hours as needed. 30 Tab 3   • zolpidem (AMBIEN) 10 MG Tab Take 1 tablet by mouth at bedtime as needed for Sleep for up to 30 days. (Patient not taking: Reported on 8/26/2021) 30 tablet 0   • [START ON 9/23/2021] zolpidem (AMBIEN) 10 MG Tab Take 1 tablet by mouth at bedtime as needed for Sleep for up to 30 days. (Patient not taking: Reported on 8/26/2021) 30 tablet 0     No current facility-administered medications for this visit.       Social History     Tobacco Use   • Smoking status: Former Smoker     Packs/day: 0.25     Years: 30.00     Pack years: 7.50     Types:  "Cigarettes     Quit date: 2014     Years since quittin.4   • Smokeless tobacco: Never Used   Vaping Use   • Vaping Use: Never used   Substance Use Topics   • Alcohol use: Yes     Alcohol/week: 0.6 oz     Types: 1 Glasses of wine per week     Comment: about 1 every other  month   • Drug use: No     Social History     Social History Narrative   • Not on file       Family History   Problem Relation Age of Onset   • Hypertension Mother    • Cancer Mother 50        breast cancer   • Cancer Father 50        Prostate cancer   • Hypertension Brother    • Diabetes Brother    • Heart Disease Paternal Grandfather         MI 70s   • Hypertension Brother        Allergies, past medical history, past surgical history, family history, social history reviewed and updated.    Review of Systems:     - Constitutional: Negative for fever, chills, unexpected weight change, and fatigue/generalized weakness.     - Respiratory: Negative for cough, sputum production, chest congestion, dyspnea, wheezing, and crackles.      - Cardiovascular: Negative for chest pain, palpitations, orthopnea, and bilateral lower extremity edema.     - Psychiatric/Behavioral: Negative for depression, suicidal/homicidal ideation and memory loss.      All other systems reviewed and are negative    Exam:    /84 (BP Location: Left arm, Patient Position: Sitting, BP Cuff Size: Adult)   Pulse 74   Temp 36.7 °C (98 °F) (Temporal)   Resp 16   Ht 1.715 m (5' 7.5\")   Wt 71.8 kg (158 lb 6.4 oz)   LMP  (LMP Unknown)   SpO2 99%   Breastfeeding No   BMI 24.44 kg/m²  Body mass index is 24.44 kg/m².    Physical Exam:  Constitutional: Well-developed and well-nourished. Not diaphoretic. No distress.   Cardiovascular: Regular rate and rhythm, S1 and S2 without murmur, rubs, or gallops.    Chest: Effort normal. Clear to auscultation throughout. No adventitious sounds.   Neurological: Alert and oriented x 3.   Psychiatric:  Behavior, mood, and affect are " appropriate.  MA/nursing note and vitals reviewed.    LABS /  US: 2021  results reviewed and discussed with the patient, questions answered.    My total time spent caring for the patient on the day of the encounter was 25 minutes.   This does not include time spent on separately billable procedures/tests.     Assessment/Plan:  1. Endometrioma  Uncontrolled, stable.  Patient to follow-up with OB.    2. Abdominal distension (gaseous)  Uncontrolled, stable.  Would like to obtain CT for further diagnosis.  Suspecting mesenteric arterial syndrome /ischemia.  We will also obtain pancreatic enzymes.  - CT-ABDOMEN-PELVIS WITH & W/O; Future  - LIPASE; Future  - AMYLASE; Future    3. Abdominal pain, unspecified abdominal location  As discussed in 2  - CT-ABDOMEN-PELVIS WITH & W/O; Future  - LIPASE; Future  - AMYLASE; Future    4. Encounter to discuss test results  Ultrasound results and lab results discussed with patient.  All questions answered at this time.       Discussed with patient possible alternative diagnoses, pt is to take all medications as prescribed. If symptoms persist FU w/PCP, if symptoms worsen go to emergency room. If experiencing any side effects from prescribed medications reports to the office immediately or go to emergency room.  Reviewed indication, dosage, usage and potential adverse effects of prescribed medications. Reviewed risks and benefits of treatment plan. Patient verbalizes understanding of all instruction and verbally agrees to plan.    No follow-ups on file. 1 month

## 2021-08-27 NOTE — ASSESSMENT & PLAN NOTE
chronic intermittent persistent abdominal pains last for 30 minutes with a frequency of 3 times a week, previously more frequently.  Patient was unable to identify triggers in foods.  Associated symptoms include nausea, denies.  Patient is scheduled to follow-up with GI in September and neurology as well.

## 2021-09-09 DIAGNOSIS — R14.0 ABDOMINAL DISTENSION (GASEOUS): ICD-10-CM

## 2021-10-12 ENCOUNTER — APPOINTMENT (OUTPATIENT)
Dept: MEDICAL GROUP | Facility: PHYSICIAN GROUP | Age: 56
End: 2021-10-12
Payer: COMMERCIAL

## 2021-10-14 ENCOUNTER — TELEMEDICINE (OUTPATIENT)
Dept: MEDICAL GROUP | Facility: PHYSICIAN GROUP | Age: 56
End: 2021-10-14
Payer: COMMERCIAL

## 2021-10-14 VITALS
DIASTOLIC BLOOD PRESSURE: 71 MMHG | HEIGHT: 67 IN | WEIGHT: 162 LBS | BODY MASS INDEX: 25.43 KG/M2 | SYSTOLIC BLOOD PRESSURE: 138 MMHG

## 2021-10-14 DIAGNOSIS — Z76.0 MEDICATION REFILL: ICD-10-CM

## 2021-10-14 DIAGNOSIS — G47.00 INSOMNIA, UNSPECIFIED TYPE: ICD-10-CM

## 2021-10-14 PROCEDURE — 99213 OFFICE O/P EST LOW 20 MIN: CPT | Mod: 95 | Performed by: NURSE PRACTITIONER

## 2021-10-14 RX ORDER — METHENAMINE, SODIUM PHOSPHATE, MONOBASIC, MONOHYDRATE, PHENYL SALICYLATE, METHYLENE BLUE, AND HYOSCYAMINE SULFATE 118; 40.8; 36; 10; .12 MG/1; MG/1; MG/1; MG/1; MG/1
1 CAPSULE ORAL 4 TIMES DAILY
COMMUNITY
End: 2022-01-18

## 2021-10-14 RX ORDER — ZOLPIDEM TARTRATE 10 MG/1
10 TABLET ORAL NIGHTLY PRN
Qty: 30 TABLET | Refills: 0 | Status: SHIPPED | OUTPATIENT
Start: 2021-10-23 | End: 2021-11-22

## 2021-10-14 RX ORDER — MELOXICAM 7.5 MG/1
TABLET ORAL
COMMUNITY
Start: 2021-08-17

## 2021-10-14 RX ORDER — ZOLPIDEM TARTRATE 10 MG/1
10 TABLET ORAL NIGHTLY PRN
Qty: 30 TABLET | Refills: 0 | Status: SHIPPED | OUTPATIENT
Start: 2021-11-22 | End: 2021-12-22

## 2021-10-14 RX ORDER — ZOLPIDEM TARTRATE 10 MG/1
10 TABLET ORAL NIGHTLY PRN
Qty: 30 TABLET | Refills: 0 | Status: SHIPPED | OUTPATIENT
Start: 2021-12-22 | End: 2022-01-18 | Stop reason: SDUPTHER

## 2021-10-14 RX ORDER — OXYBUTYNIN CHLORIDE 15 MG/1
TABLET, EXTENDED RELEASE ORAL
COMMUNITY
Start: 2021-10-01 | End: 2022-01-18

## 2021-10-14 RX ORDER — OXYBUTYNIN CHLORIDE 15 MG/1
15 TABLET, EXTENDED RELEASE ORAL DAILY
COMMUNITY
End: 2022-01-18

## 2021-10-14 ASSESSMENT — PAIN SCALES - GENERAL: PAINLEVEL: 5=MODERATE PAIN

## 2021-10-14 ASSESSMENT — FIBROSIS 4 INDEX: FIB4 SCORE: 0.93

## 2021-10-14 NOTE — PROGRESS NOTES
"Telemedicine Visit: Established Patient     This encounter was conducted via Zoom.   Verbal consent was obtained. Patient's identity was verified.    Subjective:     Chief Complaint   Patient presents with   • Medication Refill     Guerda Cavazos is a 55 y.o. female presenting for evaluation and management of following problems:    Insomnia  Chronic problem.  Well-controlled on Ambien 10 mg nightly, no changes, no side effects.  Has tried multiple alternatives in the past without success.  Last controlled substance agreement signed on 1/14/2021, eligible for refill starting 10/23/2021.  Per PDMP last refill 9/23/2021.      ROS   Denies any recent fevers or chills. No nausea or vomiting. No chest pains or shortness of breath.     Allergies   Allergen Reactions   • Other Food Itching and Swelling     Coconut and peanut  RXN=ongoing   • Coconut Fatty Acids Swelling     Coconut causes throat swelling per RN.   Coconut causes throat swelling per RN.    • Coconut Oil Unspecified and Swelling     throat swelling  throat swelling   • Peanut-Derived Unspecified     Peanuts cause throat swelling.   Peanuts cause throat swelling.    • Prednisolone Unspecified     \"Burning eyes,had to be flushed out.\"       Current medicines (including changes today)  Current Outpatient Medications   Medication Sig Dispense Refill   • Meth-Hyo-M Bl-Na Phos-Ph Sal (URIBEL) 118 MG Cap Take 1 Tablet by mouth 4 times a day. Indications: Difficult or Painful Urination     • [START ON 10/23/2021] zolpidem (AMBIEN) 10 MG Tab Take 1 Tablet by mouth at bedtime as needed for Sleep for up to 30 days. 30 Tablet 0   • [START ON 11/22/2021] zolpidem (AMBIEN) 10 MG Tab Take 1 Tablet by mouth at bedtime as needed for Sleep for up to 30 days. 30 Tablet 0   • [START ON 12/22/2021] zolpidem (AMBIEN) 10 MG Tab Take 1 Tablet by mouth at bedtime as needed for Sleep for up to 30 days. 30 Tablet 0   • folic acid (FOLVITE) 1 MG Tab      • mycophenolate (CELLCEPT) 500 " MG tablet Take 500 mg by mouth 2 times a day.     • Fremanezumab-vfrm (AJOVY) 225 MG/1.5ML Solution Auto-injector Inject  under the skin.     • Probiotic Product (FORTIFY OPTIMA PROBIOTIC PO) Take  by mouth.     • estradiol (ESTRACE) 1 MG Tab      • Carboxymethylcellulose (REFRESH) 1 % Gel 1 gtt in each eye 2 times a day for 30 day(s)     • Erenumab-aooe (AIMOVIG) 140 MG/ML Solution Auto-injector as directed subcutaneously once a month     • losartan (COZAAR) 25 MG Tab Take 50 mg by mouth every day.     • rosuvastatin (CRESTOR) 5 MG Tab Take 1 tablet by mouth at bedtime as needed. 90 tablet 3   • vitamin D, Ergocalciferol, (DRISDOL) 1.25 MG (21803 UT) Cap capsule Take 1 Cap by mouth every 7 days. 12 Cap 3   • Meloxicam 5 MG Cap Take 5 mg by mouth every day.     • progesterone (PROMETRIUM) 200 MG capsule Take 200 mg by mouth.     • cyclosporin (RESTASIS) 0.05 % ophthalmic emulsion Place 1 Drop in both eyes 2 times a day.     • ondansetron (ZOFRAN ODT) 4 MG TABLET DISPERSIBLE Take 1 Tab by mouth every 8 hours as needed. 30 Tab 3   • meloxicam (MOBIC) 7.5 MG Tab      • oxybutynin (DITROPAN XL) 15 MG CR tablet      • oxybutynin (DITROPAN XL) 15 MG CR tablet Take 15 mg by mouth every day.       No current facility-administered medications for this visit.       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   • Irritable bowel syndrome 12/22/2020   • Vitamin D deficiency 07/21/2020   • Establishing care with new doctor, encounter for 04/08/2020   • Dyslipidemia 04/08/2020   • Thyroid eye disease 01/01/2020   • Nocturia 04/26/2019   • Hx of transient ischemic attack (TIA) 04/18/2019   • Insomnia 04/18/2019   •  "Connective tissue disease, undifferentiated ([facility]) 04/18/2019   • Benign familial tremor 03/16/2017   • Ocular proptosis 03/16/2017   • Migraine without aura and without status migrainosus, not intractable 03/16/2017   • Degenerative cervical disc 04/05/2016       Family History   Problem Relation Age of Onset   • Hypertension Mother    • Cancer Mother 50        breast cancer   • Cancer Father 50        Prostate cancer   • Hypertension Brother    • Diabetes Brother    • Heart Disease Paternal Grandfather         MI 70s   • Hypertension Brother        She  has a past medical history of Bowel habit changes, Connective tissue disease, undifferentiated ([facility]) (2017), Dyslipidemia (4/8/2020), Migraine without aura and without status migrainosus, not intractable (3/16/2017), Pain, and Stroke ([facility]) (2010). She also has no past medical history of Head ache or Hypertension.  She  has a past surgical history that includes gyn surgery (2010); other neurological surg (03/30/2015); other neurological surg (12/15); cervical disk and fusion anterior (N/A, 4/5/2016); abdominal exploration; cholecystectomy; and appendectomy.       Objective:   Vitals obtained by patient:  /71   Ht 1.702 m (5' 7\")   Wt 73.5 kg (162 lb)   LMP  (LMP Unknown)   BMI 25.37 kg/m²      Physical Exam:  General: No acute distress. Well nourished.   HEENT: EOM grossly intact, no scleral icterus, no pharyngeal erythema.   Neck:  No JVD noted at 90 degrees, trachea midline  CVS: Pulse as reported by patient, no visible LE edema.  Resp: Unlabored respiratory effort, no cough or audible wheeze  MSK/Ext: No clubbing or cyanosis visible appreciated.  Skin: No rashes in visible areas.  Neurological: AOx3. CN III-XII grossly intact. No focal deficits.       This does not include time spent on separately billable procedures/tests.   Assessment and Plan:   1. Insomnia, unspecified type  Controlled substance discussed with client. Client agrees to abide by " controlled substance contract.  - zolpidem (AMBIEN) 10 MG Tab; Take 1 Tablet by mouth at bedtime as needed for Sleep for up to 30 days.  Dispense: 30 Tablet; Refill: 0  - zolpidem (AMBIEN) 10 MG Tab; Take 1 Tablet by mouth at bedtime as needed for Sleep for up to 30 days.  Dispense: 30 Tablet; Refill: 0  - zolpidem (AMBIEN) 10 MG Tab; Take 1 Tablet by mouth at bedtime as needed for Sleep for up to 30 days.  Dispense: 30 Tablet; Refill: 0    2. Medication refill       Follow-up: No follow-ups on file.

## 2021-10-14 NOTE — ASSESSMENT & PLAN NOTE
Chronic problem.  Well-controlled on Ambien 10 mg nightly, no changes, no side effects.  Has tried multiple alternatives in the past without success.  Last controlled substance agreement signed on 1/14/2021, eligible for refill starting 10/23/2021.  Per PDMP last refill 9/23/2021.

## 2021-12-18 DIAGNOSIS — G47.00 INSOMNIA, UNSPECIFIED TYPE: ICD-10-CM

## 2021-12-21 RX ORDER — ZOLPIDEM TARTRATE 10 MG/1
TABLET ORAL
Qty: 30 TABLET | Refills: 0 | Status: SHIPPED
Start: 2021-12-21 | End: 2022-01-18

## 2022-01-18 ENCOUNTER — OFFICE VISIT (OUTPATIENT)
Dept: MEDICAL GROUP | Facility: PHYSICIAN GROUP | Age: 57
End: 2022-01-18
Payer: COMMERCIAL

## 2022-01-18 VITALS
DIASTOLIC BLOOD PRESSURE: 80 MMHG | TEMPERATURE: 98.2 F | WEIGHT: 160.2 LBS | OXYGEN SATURATION: 99 % | HEART RATE: 80 BPM | BODY MASS INDEX: 25.15 KG/M2 | HEIGHT: 67 IN | RESPIRATION RATE: 16 BRPM | SYSTOLIC BLOOD PRESSURE: 122 MMHG

## 2022-01-18 DIAGNOSIS — E78.5 DYSLIPIDEMIA: ICD-10-CM

## 2022-01-18 DIAGNOSIS — G47.00 INSOMNIA, UNSPECIFIED TYPE: ICD-10-CM

## 2022-01-18 DIAGNOSIS — R14.0 ABDOMINAL BLOATING: ICD-10-CM

## 2022-01-18 DIAGNOSIS — Z79.899 CONTROLLED SUBSTANCE AGREEMENT SIGNED: ICD-10-CM

## 2022-01-18 DIAGNOSIS — Z00.00 ANNUAL PHYSICAL EXAM: ICD-10-CM

## 2022-01-18 DIAGNOSIS — E55.9 VITAMIN D DEFICIENCY: ICD-10-CM

## 2022-01-18 PROCEDURE — 99214 OFFICE O/P EST MOD 30 MIN: CPT | Performed by: NURSE PRACTITIONER

## 2022-01-18 RX ORDER — OMEPRAZOLE 20 MG/1
CAPSULE, DELAYED RELEASE ORAL
COMMUNITY
Start: 2021-12-21 | End: 2022-06-21

## 2022-01-18 RX ORDER — UBROGEPANT 100 MG/1
TABLET ORAL
COMMUNITY

## 2022-01-18 RX ORDER — TRIAMCINOLONE ACETONIDE 1 MG/G
CREAM TOPICAL
COMMUNITY
Start: 2021-10-26

## 2022-01-18 RX ORDER — ZOLPIDEM TARTRATE 10 MG/1
10 TABLET ORAL NIGHTLY PRN
Qty: 30 TABLET | Refills: 0 | Status: SHIPPED | OUTPATIENT
Start: 2022-02-19 | End: 2022-03-21

## 2022-01-18 RX ORDER — LINACLOTIDE 290 UG/1
CAPSULE, GELATIN COATED ORAL
COMMUNITY
Start: 2022-01-04

## 2022-01-18 RX ORDER — ZOLPIDEM TARTRATE 10 MG/1
10 TABLET ORAL NIGHTLY PRN
Qty: 30 TABLET | Refills: 0 | Status: SHIPPED
Start: 2022-03-21 | End: 2022-04-14

## 2022-01-18 RX ORDER — ZOLPIDEM TARTRATE 10 MG/1
10 TABLET ORAL NIGHTLY PRN
Qty: 30 TABLET | Refills: 0 | Status: SHIPPED | OUTPATIENT
Start: 2022-01-20 | End: 2022-02-19

## 2022-01-18 RX ORDER — ERGOCALCIFEROL 1.25 MG/1
50000 CAPSULE ORAL
Qty: 12 CAPSULE | Refills: 6 | Status: SHIPPED | OUTPATIENT
Start: 2022-01-18 | End: 2022-04-05 | Stop reason: SDUPTHER

## 2022-01-18 ASSESSMENT — FIBROSIS 4 INDEX: FIB4 SCORE: 0.94

## 2022-01-18 ASSESSMENT — PATIENT HEALTH QUESTIONNAIRE - PHQ9: CLINICAL INTERPRETATION OF PHQ2 SCORE: 0

## 2022-01-19 NOTE — ASSESSMENT & PLAN NOTE
Chronic problem.  Had recent EGD and was diagnosed with GERD and IBS.  Patient is followed by GI.  She is utilizing nutrition

## 2022-01-19 NOTE — ASSESSMENT & PLAN NOTE
Per recent labs 1/18/2022 vitamin D levels at 23, previously on vitamin D prescription every other week.

## 2022-01-19 NOTE — ASSESSMENT & PLAN NOTE
Chronic and stable on current med management, no side effects.  Previously tried multiple alternatives and is maintaining sleep hygiene.  Needing refills today.  CS agreement signed today.  Last refill on 12/21/21 per PDMP

## 2022-01-19 NOTE — PROGRESS NOTES
Chief Complaint   Patient presents with   • Follow-Up     meds   • Lab Results       HISTORY OF PRESENT ILLNESS: Patient is a 56 y.o. female, established patient who presents today to discuss medical problems as listed below:    Health Maintenance:  COMPLETED     Insomnia  Chronic and stable on current med management, no side effects.  Previously tried multiple alternatives and is maintaining sleep hygiene.  Needing refills today.  CS agreement signed today.  Last refill on 12/21/21 per PDMP    Abdominal bloating  Chronic problem.  Had recent EGD and was diagnosed with GERD and IBS.  Patient is followed by GI.  She is utilizing nutrition    Vitamin D deficiency  Per recent labs 1/18/2022 vitamin D levels at 23, previously on vitamin D prescription every other week.      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   • Irritable bowel syndrome 12/22/2020   • Vitamin D deficiency 07/21/2020   • Establishing care with new doctor, encounter for 04/08/2020   • Dyslipidemia 04/08/2020   • Thyroid eye disease 01/01/2020   • Nocturia 04/26/2019   • Hx of transient ischemic attack (TIA) 04/18/2019   • Insomnia 04/18/2019   • Connective tissue disease, undifferentiated ([facility]) 04/18/2019   • Benign familial tremor 03/16/2017   • Ocular proptosis 03/16/2017   • Migraine without aura and without status migrainosus, not intractable 03/16/2017   • Degenerative cervical disc 04/05/2016        Allergies: Other food, Coconut fatty acids, Coconut oil, Peanut-derived, and Prednisolone    Current Outpatient Medications   Medication Sig Dispense Refill   • omeprazole (PRILOSEC) 20 MG delayed-release capsule      • triamcinolone  acetonide (KENALOG) 0.1 % Cream Apply  topically.     • LINZESS 290 MCG Cap      • Ubrogepant (UBRELVY) 100 MG Tab Take  by mouth.     • vitamin D2, Ergocalciferol, (DRISDOL) 1.25 MG (86568 UT) Cap capsule Take 1 Capsule by mouth every 7 days. 12 Capsule 6   • [START ON 2022] zolpidem (AMBIEN) 10 MG Tab Take 1 Tablet by mouth at bedtime as needed for Sleep for up to 30 days. 30 Tablet 0   • [START ON 2022] zolpidem (AMBIEN) 10 MG Tab Take 1 Tablet by mouth at bedtime as needed for Sleep for up to 30 days. 30 Tablet 0   • [START ON 3/21/2022] zolpidem (AMBIEN) 10 MG Tab Take 1 Tablet by mouth at bedtime as needed for Sleep for up to 30 days. 30 Tablet 0   • meloxicam (MOBIC) 7.5 MG Tab      • folic acid (FOLVITE) 1 MG Tab      • mycophenolate (CELLCEPT) 500 MG tablet Take 500 mg by mouth 2 times a day.     • Fremanezumab-vfrm (AJOVY) 225 MG/1.5ML Solution Auto-injector Inject  under the skin.     • Probiotic Product (FORTIFY OPTIMA PROBIOTIC PO) Take  by mouth.     • estradiol (ESTRACE) 1 MG Tab      • Carboxymethylcellulose (REFRESH) 1 % Gel 1 gtt in each eye 2 times a day for 30 day(s)     • Erenumab-aooe (AIMOVIG) 140 MG/ML Solution Auto-injector as directed subcutaneously once a month     • losartan (COZAAR) 25 MG Tab Take 50 mg by mouth every day.     • rosuvastatin (CRESTOR) 5 MG Tab Take 1 tablet by mouth at bedtime as needed. 90 tablet 3   • cyclosporin (RESTASIS) 0.05 % ophthalmic emulsion Place 1 Drop in both eyes 2 times a day.     • ondansetron (ZOFRAN ODT) 4 MG TABLET DISPERSIBLE Take 1 Tab by mouth every 8 hours as needed. 30 Tab 3   • progesterone (PROMETRIUM) 200 MG capsule Take 200 mg by mouth.       No current facility-administered medications for this visit.       Social History     Tobacco Use   • Smoking status: Former Smoker     Packs/day: 0.25     Years: 30.00     Pack years: 7.50     Types: Cigarettes     Quit date: 2014     Years since quittin.8   • Smokeless tobacco: Never  "Used   Vaping Use   • Vaping Use: Never used   Substance Use Topics   • Alcohol use: Yes     Alcohol/week: 0.6 oz     Types: 1 Glasses of wine per week     Comment: about 1 every other  month   • Drug use: No     Social History     Social History Narrative   • Not on file       Family History   Problem Relation Age of Onset   • Hypertension Mother    • Cancer Mother 50        breast cancer   • Cancer Father 50        Prostate cancer   • Hypertension Brother    • Diabetes Brother    • Heart Disease Paternal Grandfather         MI 70s   • Hypertension Brother        Allergies, past medical history, past surgical history, family history, social history reviewed and updated.    Review of Systems:     - Constitutional: Negative for fever, chills, unexpected weight change, and fatigue/generalized weakness.     - Respiratory: Negative for cough, sputum production, chest congestion, dyspnea, wheezing, and crackles.      - Cardiovascular: Negative for chest pain, palpitations, orthopnea, and bilateral lower extremity edema.     - Psychiatric/Behavioral: Negative for depression, suicidal/homicidal ideation and memory loss.      All other systems reviewed and are negative    Exam:    /80   Pulse 80   Temp 36.8 °C (98.2 °F) (Temporal)   Resp 16   Ht 1.702 m (5' 7\")   Wt 72.7 kg (160 lb 3.2 oz)   LMP  (LMP Unknown)   SpO2 99%   BMI 25.09 kg/m²  Body mass index is 25.09 kg/m².    Physical Exam:  Constitutional: Well-developed and well-nourished. Not diaphoretic. No distress.   Cardiovascular: Regular rate and rhythm, S1 and S2 without murmur, rubs, or gallops.    Chest: Effort normal. Clear to auscultation throughout. No adventitious sounds.   Neurological: Alert and oriented x 3.   Psychiatric:  Behavior, mood, and affect are appropriate.  MA/nursing note and vitals reviewed.    LABS: 7/2021  results reviewed and discussed with the patient, questions answered.    Assessment/Plan:  1. Vitamin D deficiency  - vitamin " D2, Ergocalciferol, (DRISDOL) 1.25 MG (49303 UT) Cap capsule; Take 1 Capsule by mouth every 7 days.  Dispense: 12 Capsule; Refill: 6  - VITAMIN D,25 HYDROXY; Future    2. Insomnia, unspecified type  Controlled substance discussed with client. Client agrees to abide by controlled substance contract.  - zolpidem (AMBIEN) 10 MG Tab; Take 1 Tablet by mouth at bedtime as needed for Sleep for up to 30 days.  Dispense: 30 Tablet; Refill: 0  - zolpidem (AMBIEN) 10 MG Tab; Take 1 Tablet by mouth at bedtime as needed for Sleep for up to 30 days.  Dispense: 30 Tablet; Refill: 0  - zolpidem (AMBIEN) 10 MG Tab; Take 1 Tablet by mouth at bedtime as needed for Sleep for up to 30 days.  Dispense: 30 Tablet; Refill: 0    3. Controlled substance agreement signed  - Controlled Substance Treatment Agreement    4. Annual physical exam  - CBC WITHOUT DIFFERENTIAL; Future  - Comp Metabolic Panel; Future  - Lipid Profile; Future  - VITAMIN D,25 HYDROXY; Future    5. Dyslipidemia  - Lipid Profile; Future    6. Abdominal bloating  Stable, followed by GI       Discussed with patient possible alternative diagnoses, pt is to take all medications as prescribed. If symptoms persist FU w/PCP, if symptoms worsen go to emergency room. If experiencing any side effects from prescribed medications reports to the office immediately or go to emergency room.  Reviewed indication, dosage, usage and potential adverse effects of prescribed medications. Reviewed risks and benefits of treatment plan. Patient verbalizes understanding of all instruction and verbally agrees to plan.    No follow-ups on file. April

## 2022-02-16 DIAGNOSIS — G47.00 INSOMNIA, UNSPECIFIED TYPE: ICD-10-CM

## 2022-02-17 RX ORDER — ZOLPIDEM TARTRATE 10 MG/1
TABLET ORAL
Qty: 30 TABLET | Refills: 1 | Status: SHIPPED | OUTPATIENT
Start: 2022-02-17 | End: 2022-03-19

## 2022-04-05 DIAGNOSIS — E55.9 VITAMIN D DEFICIENCY: ICD-10-CM

## 2022-04-05 RX ORDER — ERGOCALCIFEROL 1.25 MG/1
50000 CAPSULE ORAL
Qty: 12 CAPSULE | Refills: 6 | Status: SHIPPED | OUTPATIENT
Start: 2022-04-05 | End: 2023-06-20

## 2022-04-07 DIAGNOSIS — Z00.6 RESEARCH STUDY PATIENT: ICD-10-CM

## 2022-04-08 ENCOUNTER — HOSPITAL ENCOUNTER (OUTPATIENT)
Facility: MEDICAL CENTER | Age: 57
End: 2022-04-08
Attending: PATHOLOGY
Payer: COMMERCIAL

## 2022-04-08 DIAGNOSIS — Z00.6 RESEARCH STUDY PATIENT: ICD-10-CM

## 2022-04-13 LAB
ELF SCORE: 9.6
RELATIVE RISK: NORMAL
RISK GROUP: NORMAL
RISK: 3.3 %

## 2022-04-14 ENCOUNTER — OFFICE VISIT (OUTPATIENT)
Dept: MEDICAL GROUP | Facility: PHYSICIAN GROUP | Age: 57
End: 2022-04-14
Payer: COMMERCIAL

## 2022-04-14 VITALS
RESPIRATION RATE: 14 BRPM | TEMPERATURE: 98.5 F | DIASTOLIC BLOOD PRESSURE: 76 MMHG | HEIGHT: 67 IN | SYSTOLIC BLOOD PRESSURE: 118 MMHG | OXYGEN SATURATION: 96 % | WEIGHT: 162 LBS | HEART RATE: 83 BPM | BODY MASS INDEX: 25.43 KG/M2

## 2022-04-14 DIAGNOSIS — E55.9 VITAMIN D DEFICIENCY: ICD-10-CM

## 2022-04-14 DIAGNOSIS — G47.00 INSOMNIA, UNSPECIFIED TYPE: ICD-10-CM

## 2022-04-14 PROCEDURE — 99214 OFFICE O/P EST MOD 30 MIN: CPT | Performed by: NURSE PRACTITIONER

## 2022-04-14 RX ORDER — ONDANSETRON 4 MG/1
TABLET, FILM COATED ORAL
COMMUNITY
Start: 2022-01-26

## 2022-04-14 RX ORDER — FOLIC ACID 1 MG/1
1 TABLET ORAL DAILY
COMMUNITY
Start: 2021-12-17 | End: 2022-06-21

## 2022-04-14 RX ORDER — OMEPRAZOLE 20 MG/1
40 CAPSULE, DELAYED RELEASE ORAL
COMMUNITY
Start: 2022-03-24

## 2022-04-14 RX ORDER — LINACLOTIDE 145 UG/1
CAPSULE, GELATIN COATED ORAL
COMMUNITY
Start: 2021-12-14 | End: 2022-04-14

## 2022-04-14 RX ORDER — MYCOPHENOLATE MOFETIL 250 MG/1
CAPSULE ORAL
COMMUNITY
Start: 2021-11-29 | End: 2022-06-21

## 2022-04-14 RX ORDER — ZOLPIDEM TARTRATE 10 MG/1
10 TABLET ORAL NIGHTLY PRN
Qty: 30 TABLET | Refills: 0 | Status: SHIPPED | OUTPATIENT
Start: 2022-04-17 | End: 2022-05-17

## 2022-04-14 RX ORDER — ZOLPIDEM TARTRATE 10 MG/1
10 TABLET ORAL NIGHTLY PRN
Qty: 30 TABLET | Refills: 0 | Status: SHIPPED | OUTPATIENT
Start: 2022-05-17 | End: 2022-06-16

## 2022-04-14 RX ORDER — ZOLPIDEM TARTRATE 10 MG/1
10 TABLET ORAL NIGHTLY PRN
Qty: 30 TABLET | Refills: 0 | Status: SHIPPED | OUTPATIENT
Start: 2022-06-16 | End: 2022-06-21 | Stop reason: SDUPTHER

## 2022-04-14 RX ORDER — LOSARTAN POTASSIUM 50 MG/1
TABLET ORAL
COMMUNITY
Start: 2022-01-26 | End: 2022-06-21

## 2022-04-14 RX ORDER — METHENAMINE, SODIUM PHOSPHATE, MONOBASIC, MONOHYDRATE, PHENYL SALICYLATE, METHYLENE BLUE, AND HYOSCYAMINE SULFATE 118; 40.8; 36; 10; .12 MG/1; MG/1; MG/1; MG/1; MG/1
CAPSULE ORAL
COMMUNITY
Start: 2021-10-13

## 2022-04-14 RX ORDER — MYCOPHENOLATE MOFETIL 250 MG/1
CAPSULE ORAL
COMMUNITY
Start: 2022-04-08 | End: 2022-06-21

## 2022-04-14 RX ORDER — ONDANSETRON 4 MG/1
4 TABLET, FILM COATED ORAL
COMMUNITY
Start: 2022-01-26 | End: 2022-06-21

## 2022-04-14 RX ORDER — LOSARTAN POTASSIUM 50 MG/1
50 TABLET ORAL
COMMUNITY
Start: 2022-01-26

## 2022-04-14 ASSESSMENT — FIBROSIS 4 INDEX: FIB4 SCORE: 0.94

## 2022-04-15 ENCOUNTER — APPOINTMENT (OUTPATIENT)
Dept: MEDICAL GROUP | Facility: PHYSICIAN GROUP | Age: 57
End: 2022-04-15
Payer: COMMERCIAL

## 2022-04-15 NOTE — ASSESSMENT & PLAN NOTE
Chronic and stable. On Ambien 10 mg, so SEs. Unable to tolerate alternatives in the past. Refills today. Last refill on 3/18/22 per PDMP. CS agreement signed 1/18/22

## 2022-04-15 NOTE — PROGRESS NOTES
Chief Complaint   Patient presents with   • Medication Refill   • Other     Did a lab test through [facility] and elevated vitamin D levels        HISTORY OF PRESENT ILLNESS: Patient is a 56 y.o. female, established patient who presents today to discuss medical problems as listed below:    Health Maintenance:  COMPLETED     Vitamin D deficiency  Labs from 4/14/22 vit D at 60. On weekly supplement     Insomnia  Chronic and stable. On Ambien 10 mg, so SEs. Unable to tolerate alternatives in the past. Refills today. Last refill on 3/18/22 per PDMP. 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   • Irritable bowel syndrome 12/22/2020   • Vitamin D deficiency 07/21/2020   • Establishing care with new doctor, encounter for 04/08/2020   • Dyslipidemia 04/08/2020   • Thyroid eye disease 01/01/2020   • Nocturia 04/26/2019   • Hx of transient ischemic attack (TIA) 04/18/2019   • Insomnia 04/18/2019   • Connective tissue disease, undifferentiated ([facility]) 04/18/2019   • Benign familial tremor 03/16/2017   • Ocular proptosis 03/16/2017   • Migraine without aura and without status migrainosus, not intractable 03/16/2017   • Degenerative cervical disc 04/05/2016        Allergies: Other food, Coconut fatty acids, Coconut oil, Peanut-derived, and Prednisolone    Current Outpatient Medications   Medication Sig Dispense Refill   • omeprazole (PRILOSEC) 20 MG delayed-release capsule Take 40 mg by mouth.     • mycophenolate (CELLCEPT) 250 MG Cap      • mycophenolate (CELLCEPT) 250 MG Cap TAKE 3 CAPSULES TWICE A DAY     • losartan (COZAAR) 50 MG Tab      • losartan (COZAAR) 50 MG Tab Take 50 mg by mouth.      • linaCLOtide 290 MCG Cap Take 290 mcg by mouth.     • folic acid (FOLVITE) 1 MG Tab Take 1 Tablet by mouth every day.     • ondansetron (ZOFRAN) 4 MG Tab tablet      • ondansetron (ZOFRAN) 4 MG Tab tablet 4 mg.     • Meth-Hyo-M Bl-Na Phos-Ph Sal (URIBEL) 118 MG Cap      • [START ON 4/17/2022] zolpidem (AMBIEN) 10 MG Tab Take 1 Tablet by mouth at bedtime as needed for Sleep for up to 30 days. 30 Tablet 0   • [START ON 5/17/2022] zolpidem (AMBIEN) 10 MG Tab Take 1 Tablet by mouth at bedtime as needed for Sleep for up to 30 days. 30 Tablet 0   • [START ON 6/16/2022] zolpidem (AMBIEN) 10 MG Tab Take 1 Tablet by mouth at bedtime as needed for Sleep for up to 30 days. 30 Tablet 0   • vitamin D2, Ergocalciferol, (DRISDOL) 1.25 MG (40143 UT) Cap capsule Take 1 Capsule by mouth every 7 days. 12 Capsule 6   • omeprazole (PRILOSEC) 20 MG delayed-release capsule      • triamcinolone acetonide (KENALOG) 0.1 % Cream Apply  topically.     • LINZESS 290 MCG Cap      • Ubrogepant (UBRELVY) 100 MG Tab Take  by mouth.     • meloxicam (MOBIC) 7.5 MG Tab      • folic acid (FOLVITE) 1 MG Tab      • mycophenolate (CELLCEPT) 500 MG tablet Take 500 mg by mouth 2 times a day.     • Fremanezumab-vfrm (AJOVY) 225 MG/1.5ML Solution Auto-injector Inject  under the skin.     • Probiotic Product (FORTIFY OPTIMA PROBIOTIC PO) Take  by mouth.     • estradiol (ESTRACE) 1 MG Tab      • Carboxymethylcellulose (REFRESH) 1 % Gel 1 gtt in each eye 2 times a day for 30 day(s)     • Erenumab-aooe (AIMOVIG) 140 MG/ML Solution Auto-injector as directed subcutaneously once a month     • rosuvastatin (CRESTOR) 5 MG Tab Take 1 tablet by mouth at bedtime as needed. 90 tablet 3   • progesterone (PROMETRIUM) 200 MG capsule Take 200 mg by mouth.     • cyclosporin (RESTASIS) 0.05 % ophthalmic emulsion Place 1 Drop in both eyes 2 times a day.     • ondansetron (ZOFRAN ODT) 4 MG TABLET DISPERSIBLE Take 1 Tab by mouth every 8 hours as needed. 30 Tab 3     No current  "facility-administered medications for this visit.       Social History     Tobacco Use   • Smoking status: Former Smoker     Packs/day: 0.25     Years: 30.00     Pack years: 7.50     Types: Cigarettes     Quit date: 2014     Years since quittin.0   • Smokeless tobacco: Never Used   Vaping Use   • Vaping Use: Never used   Substance Use Topics   • Alcohol use: Yes     Alcohol/week: 0.6 oz     Types: 1 Glasses of wine per week     Comment: about 1 every other  month   • Drug use: No     Social History     Social History Narrative   • Not on file       Family History   Problem Relation Age of Onset   • Hypertension Mother    • Cancer Mother 50        breast cancer   • Cancer Father 50        Prostate cancer   • Hypertension Brother    • Diabetes Brother    • Heart Disease Paternal Grandfather         MI 70s   • Hypertension Brother        Allergies, past medical history, past surgical history, family history, social history reviewed and updated.    Review of Systems:     - Constitutional: Negative for fever, chills, unexpected weight change, and fatigue/generalized weakness.     - Respiratory: Negative for cough, sputum production, chest congestion, dyspnea, wheezing, and crackles.      - Cardiovascular: Negative for chest pain, palpitations, orthopnea, and bilateral lower extremity edema.    - Psychiatric/Behavioral: Negative for depression, suicidal/homicidal ideation and memory loss.      All other systems reviewed and are negative    Exam:    /76 (BP Location: Right arm, Patient Position: Sitting)   Pulse 83   Temp 36.9 °C (98.5 °F) (Temporal)   Resp 14   Ht 1.702 m (5' 7\")   Wt 73.5 kg (162 lb)   LMP  (LMP Unknown)   SpO2 96%   BMI 25.37 kg/m²  Body mass index is 25.37 kg/m².    Physical Exam:  Constitutional: Well-developed and well-nourished. Not diaphoretic. No distress.   Cardiovascular: Regular rate and rhythm, S1 and S2 without murmur, rubs, or gallops.    Chest: Effort normal. Clear to " auscultation throughout. No adventitious sounds.   Neurological: Alert and oriented x 3.   Psychiatric:  Behavior, mood, and affect are appropriate.  MA/nursing note and vitals reviewed.    LABS: 4/2022  results reviewed and discussed with the patient, questions answered.    Assessment/Plan:  1. Vitamin D deficiency  Stable.  Continue weekly supplementation.    2. Insomnia, unspecified type  Controlled substance discussed with client. Client agrees to abide by controlled substance contract.  - zolpidem (AMBIEN) 10 MG Tab; Take 1 Tablet by mouth at bedtime as needed for Sleep for up to 30 days.  Dispense: 30 Tablet; Refill: 0  - zolpidem (AMBIEN) 10 MG Tab; Take 1 Tablet by mouth at bedtime as needed for Sleep for up to 30 days.  Dispense: 30 Tablet; Refill: 0  - zolpidem (AMBIEN) 10 MG Tab; Take 1 Tablet by mouth at bedtime as needed for Sleep for up to 30 days.  Dispense: 30 Tablet; Refill: 0       Discussed with patient possible alternative diagnoses, pt is to take all medications as prescribed. If symptoms persist FU w/PCP, if symptoms worsen go to emergency room. If experiencing any side effects from prescribed medications reports to the office immediately or go to emergency room.  Reviewed indication, dosage, usage and potential adverse effects of prescribed medications. Reviewed risks and benefits of treatment plan. Patient verbalizes understanding of all instruction and verbally agrees to plan.    No follow-ups on file. July 2022

## 2022-06-21 ENCOUNTER — OFFICE VISIT (OUTPATIENT)
Dept: MEDICAL GROUP | Facility: PHYSICIAN GROUP | Age: 57
End: 2022-06-21
Payer: COMMERCIAL

## 2022-06-21 VITALS
HEIGHT: 67 IN | RESPIRATION RATE: 14 BRPM | DIASTOLIC BLOOD PRESSURE: 62 MMHG | BODY MASS INDEX: 25.39 KG/M2 | HEART RATE: 83 BPM | TEMPERATURE: 98.1 F | WEIGHT: 161.8 LBS | SYSTOLIC BLOOD PRESSURE: 110 MMHG | OXYGEN SATURATION: 97 %

## 2022-06-21 DIAGNOSIS — Z12.31 ENCOUNTER FOR SCREENING MAMMOGRAM FOR BREAST CANCER: ICD-10-CM

## 2022-06-21 DIAGNOSIS — R13.11 ORAL PHASE DYSPHAGIA: ICD-10-CM

## 2022-06-21 DIAGNOSIS — G47.00 INSOMNIA, UNSPECIFIED TYPE: ICD-10-CM

## 2022-06-21 PROBLEM — R13.10 DIFFICULTY SWALLOWING: Status: ACTIVE | Noted: 2022-06-21

## 2022-06-21 PROCEDURE — 99214 OFFICE O/P EST MOD 30 MIN: CPT | Performed by: NURSE PRACTITIONER

## 2022-06-21 RX ORDER — CYCLOSPORINE 0.5 MG/ML
1 EMULSION OPHTHALMIC
COMMUNITY

## 2022-06-21 RX ORDER — PROGESTERONE 100 MG/1
CAPSULE ORAL
COMMUNITY
Start: 2022-05-26

## 2022-06-21 RX ORDER — ZOLPIDEM TARTRATE 10 MG/1
10 TABLET ORAL NIGHTLY PRN
Qty: 30 TABLET | Refills: 0 | Status: SHIPPED | OUTPATIENT
Start: 2022-07-16 | End: 2022-08-15

## 2022-06-21 RX ORDER — ZOLPIDEM TARTRATE 10 MG/1
10 TABLET ORAL NIGHTLY PRN
Qty: 30 TABLET | Refills: 0 | Status: SHIPPED | OUTPATIENT
Start: 2022-09-14 | End: 2022-09-29 | Stop reason: SDUPTHER

## 2022-06-21 RX ORDER — ZOLPIDEM TARTRATE 10 MG/1
10 TABLET ORAL NIGHTLY PRN
Qty: 30 TABLET | Refills: 0 | Status: SHIPPED | OUTPATIENT
Start: 2022-08-15 | End: 2022-09-14

## 2022-06-21 RX ORDER — RIMEGEPANT SULFATE 75 MG/75MG
TABLET, ORALLY DISINTEGRATING ORAL
COMMUNITY
Start: 2022-05-11 | End: 2022-06-21

## 2022-06-21 RX ORDER — RIMEGEPANT SULFATE 75 MG/75MG
75 TABLET, ORALLY DISINTEGRATING ORAL
COMMUNITY
Start: 2022-05-11

## 2022-06-21 ASSESSMENT — FIBROSIS 4 INDEX: FIB4 SCORE: 0.68

## 2022-06-21 NOTE — PROGRESS NOTES
Chief Complaint   Patient presents with   • Follow-Up   • Medication Refill       HISTORY OF PRESENT ILLNESS: Patient is a 56 y.o. female, established patient who presents today to discuss medical problems as listed below:    Health Maintenance:  COMPLETED.  Declined pneumonia vaccine today.  Will obtain pneumonia vaccine together with flu vaccine when due.    Insomnia  Chronic and stable on current regimen.  Ambien 10 mg, working well for her.  No side effects.  Last refill per PDMP 6/16/2022.  Last controlled substance agreement signed on 1/18/2022.    Difficulty swallowing  New problem difficulty swallowing since March.  Patient has been following with GI and ENT.  Endoscopy was completed in December following up acid reflux.  Patient is scheduled for an upper scope with ENT next week.  Patient completed an ultrasound of the thyroid and neck in May 2022 with bilateral benign 5 mm nodules.  Plan to repeat thyroid neck ultrasound in 12 months.  Barium swallow scheduled next week.  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   • Polyclonal hypergammaglobulinemia 12/22/2020   • Irritable bowel syndrome 12/22/2020   • Vitamin D deficiency 07/21/2020   • Establishing care with new doctor, encounter for 04/08/2020   • Dyslipidemia 04/08/2020   • Thyroid eye disease 01/01/2020   • Nocturia 04/26/2019   • Hx of transient ischemic attack (TIA) 04/18/2019   • Insomnia 04/18/2019   • Connective tissue disease, undifferentiated (HCC) 04/18/2019   • Essential tremor 03/16/2017   • Ocular proptosis 03/16/2017   • Migraine without aura and without status migrainosus, not intractable 03/16/2017   • Degenerative cervical  disc 04/05/2016        Allergies: Other food, Coconut fatty acids, Coconut oil, Peanut-derived, and Prednisolone    Current Outpatient Medications   Medication Sig Dispense Refill   • cyclosporin (RESTASIS) 0.05 % ophthalmic emulsion 1 Drop.     • progesterone (PROMETRIUM) 100 MG Cap      • Rimegepant Sulfate (NURTEC) 75 MG TABLET DISPERSIBLE Take 75 mg by mouth.     • [START ON 7/16/2022] zolpidem (AMBIEN) 10 MG Tab Take 1 Tablet by mouth at bedtime as needed for Sleep for up to 30 days. 30 Tablet 0   • [START ON 8/15/2022] zolpidem (AMBIEN) 10 MG Tab Take 1 Tablet by mouth at bedtime as needed for Sleep for up to 30 days. 30 Tablet 0   • [START ON 9/14/2022] zolpidem (AMBIEN) 10 MG Tab Take 1 Tablet by mouth at bedtime as needed for Sleep for up to 30 days. 30 Tablet 0   • omeprazole (PRILOSEC) 20 MG delayed-release capsule Take 40 mg by mouth.     • losartan (COZAAR) 50 MG Tab Take 50 mg by mouth.     • linaCLOtide 290 MCG Cap Take 290 mcg by mouth.     • ondansetron (ZOFRAN) 4 MG Tab tablet      • Meth-Hyo-M Bl-Na Phos-Ph Sal (URIBEL) 118 MG Cap      • vitamin D2, Ergocalciferol, (DRISDOL) 1.25 MG (08234 UT) Cap capsule Take 1 Capsule by mouth every 7 days. 12 Capsule 6   • triamcinolone acetonide (KENALOG) 0.1 % Cream Apply  topically.     • LINZESS 290 MCG Cap      • Ubrogepant (UBRELVY) 100 MG Tab Take  by mouth.     • meloxicam (MOBIC) 7.5 MG Tab      • folic acid (FOLVITE) 1 MG Tab      • mycophenolate (CELLCEPT) 500 MG tablet Take 500 mg by mouth 2 times a day.     • Fremanezumab-vfrm (AJOVY) 225 MG/1.5ML Solution Auto-injector Inject  under the skin.     • Probiotic Product (FORTIFY OPTIMA PROBIOTIC PO) Take  by mouth.     • estradiol (ESTRACE) 1 MG Tab      • Carboxymethylcellulose (REFRESH) 1 % Gel 1 gtt in each eye 2 times a day for 30 day(s)     • Erenumab-aooe (AIMOVIG) 140 MG/ML Solution Auto-injector as directed subcutaneously once a month     • rosuvastatin (CRESTOR) 5 MG Tab Take 1 tablet by  "mouth at bedtime as needed. 90 tablet 3   • progesterone (PROMETRIUM) 200 MG capsule Take 200 mg by mouth.     • cyclosporin (RESTASIS) 0.05 % ophthalmic emulsion Place 1 Drop in both eyes 2 times a day.       No current facility-administered medications for this visit.       Social History     Tobacco Use   • Smoking status: Former Smoker     Packs/day: 0.25     Years: 30.00     Pack years: 7.50     Types: Cigarettes     Quit date: 2014     Years since quittin.2   • Smokeless tobacco: Never Used   Vaping Use   • Vaping Use: Never used   Substance Use Topics   • Alcohol use: Yes     Alcohol/week: 0.6 oz     Types: 1 Glasses of wine per week     Comment: about 1 every other  month   • Drug use: No     Social History     Social History Narrative   • Not on file       Family History   Problem Relation Age of Onset   • Hypertension Mother    • Cancer Mother 50        breast cancer   • Cancer Father 50        Prostate cancer   • Hypertension Brother    • Diabetes Brother    • Heart Disease Paternal Grandfather         MI 70s   • Hypertension Brother        Allergies, past medical history, past surgical history, family history, social history reviewed and updated.    Review of Systems:     - Constitutional: Negative for fever, chills, unexpected weight change, and fatigue/generalized weakness.     - Respiratory: Negative for cough, sputum production, chest congestion, dyspnea, wheezing, and crackles.      - Cardiovascular: Negative for chest pain, palpitations, orthopnea, and bilateral lower extremity edema.     - Psychiatric/Behavioral: Negative for depression, suicidal/homicidal ideation and memory loss.      All other systems reviewed and are negative    Exam:    /62   Pulse 83   Temp 36.7 °C (98.1 °F) (Temporal)   Resp 14   Ht 1.702 m (5' 7\")   Wt 73.4 kg (161 lb 12.8 oz)   LMP  (LMP Unknown)   SpO2 97%   BMI 25.34 kg/m²  Body mass index is 25.34 kg/m².    Physical Exam:  Constitutional: " Well-developed and well-nourished. Not diaphoretic. No distress.   Cardiovascular: Regular rate and rhythm, S1 and S2 without murmur, rubs, or gallops.    Chest: Effort normal. Clear to auscultation throughout. No adventitious sounds.   Neurological: Alert and oriented x 3.   Psychiatric:  Behavior, mood, and affect are appropriate.  MA/nursing note and vitals reviewed.    Assessment/Plan:  1. Insomnia, unspecified type  Controlled substance discussed with client. Client agrees to abide by controlled substance contract.  PDMP reviewed, controlled substance agreement signed back in 1/18/2022.  Next refill due 10/14/2022  - zolpidem (AMBIEN) 10 MG Tab; Take 1 Tablet by mouth at bedtime as needed for Sleep for up to 30 days.  Dispense: 30 Tablet; Refill: 0  - zolpidem (AMBIEN) 10 MG Tab; Take 1 Tablet by mouth at bedtime as needed for Sleep for up to 30 days.  Dispense: 30 Tablet; Refill: 0  - zolpidem (AMBIEN) 10 MG Tab; Take 1 Tablet by mouth at bedtime as needed for Sleep for up to 30 days.  Dispense: 30 Tablet; Refill: 0    2. Encounter for screening mammogram for breast cancer  - MA-SCREENING MAMMO BILAT W/TOMOSYNTHESIS W/CAD; Future    3. Oral phase dysphagia  Followed by ENT, scheduled for barium swallow next week.       Discussed with patient possible alternative diagnoses, pt is to take all medications as prescribed. If symptoms persist FU w/PCP, if symptoms worsen go to emergency room. If experiencing any side effects from prescribed medications reports to the office immediately or go to emergency room.  Reviewed indication, dosage, usage and potential adverse effects of prescribed medications. Reviewed risks and benefits of treatment plan. Patient verbalizes understanding of all instruction and verbally agrees to plan.    No follow-ups on file.  September 2020

## 2022-06-21 NOTE — ASSESSMENT & PLAN NOTE
New problem difficulty swallowing since March.  Patient has been following with GI and ENT.  Endoscopy was completed in December following up acid reflux.  Patient is scheduled for an upper scope with ENT next week.  Patient completed an ultrasound of the thyroid and neck in May 2022 with bilateral benign 5 mm nodules.  Plan to repeat thyroid neck ultrasound in 12 months.  Barium swallow scheduled next week.  I will

## 2022-06-21 NOTE — ASSESSMENT & PLAN NOTE
Chronic and stable on current regimen.  Ambien 10 mg, working well for her.  No side effects.  Last refill per PDMP 6/16/2022.  Last controlled substance agreement signed on 1/18/2022.

## 2022-06-26 DIAGNOSIS — Z86.73 HX OF TRANSIENT ISCHEMIC ATTACK (TIA): ICD-10-CM

## 2022-06-26 DIAGNOSIS — E78.5 DYSLIPIDEMIA: ICD-10-CM

## 2022-06-30 RX ORDER — ROSUVASTATIN CALCIUM 5 MG/1
TABLET, COATED ORAL
Qty: 90 TABLET | Refills: 3 | Status: SHIPPED | OUTPATIENT
Start: 2022-06-30

## 2022-08-18 NOTE — TELEPHONE ENCOUNTER
Called pharmacy to give clarification on patient Ambien medication. They stated patient picked up her medication on 8/16/2022 and has one more refill on file.

## 2022-09-29 ENCOUNTER — OFFICE VISIT (OUTPATIENT)
Dept: MEDICAL GROUP | Facility: PHYSICIAN GROUP | Age: 57
End: 2022-09-29
Payer: COMMERCIAL

## 2022-09-29 VITALS
WEIGHT: 159 LBS | HEART RATE: 68 BPM | DIASTOLIC BLOOD PRESSURE: 74 MMHG | OXYGEN SATURATION: 97 % | TEMPERATURE: 98.6 F | RESPIRATION RATE: 14 BRPM | SYSTOLIC BLOOD PRESSURE: 120 MMHG | BODY MASS INDEX: 24.96 KG/M2 | HEIGHT: 67 IN

## 2022-09-29 DIAGNOSIS — G47.00 INSOMNIA, UNSPECIFIED TYPE: ICD-10-CM

## 2022-09-29 DIAGNOSIS — Z79.899 CONTROLLED SUBSTANCE AGREEMENT SIGNED: ICD-10-CM

## 2022-09-29 PROCEDURE — 99213 OFFICE O/P EST LOW 20 MIN: CPT | Performed by: NURSE PRACTITIONER

## 2022-09-29 RX ORDER — ZOLPIDEM TARTRATE 10 MG/1
10 TABLET ORAL NIGHTLY PRN
Qty: 30 TABLET | Refills: 0 | Status: SHIPPED | OUTPATIENT
Start: 2022-10-13 | End: 2022-11-12

## 2022-09-29 RX ORDER — ZOLPIDEM TARTRATE 10 MG/1
10 TABLET ORAL NIGHTLY PRN
Qty: 30 TABLET | Refills: 0 | Status: SHIPPED | OUTPATIENT
Start: 2022-12-12 | End: 2023-01-06 | Stop reason: SDUPTHER

## 2022-09-29 RX ORDER — ZOLPIDEM TARTRATE 10 MG/1
10 TABLET ORAL NIGHTLY PRN
Qty: 30 TABLET | Refills: 0 | Status: SHIPPED | OUTPATIENT
Start: 2022-11-11 | End: 2022-12-11

## 2022-09-29 ASSESSMENT — FIBROSIS 4 INDEX: FIB4 SCORE: 0.63

## 2022-09-29 NOTE — PROGRESS NOTES
Chief Complaint   Patient presents with    Follow-Up    Medication Refill       HISTORY OF PRESENT ILLNESS: Patient is a 56 y.o. female, established patient who presents today to discuss medical problems as listed below:    Health Maintenance:  COMPLETED     Insomnia  Chronic and stable on current regimen.  Ambien 10 mg, no side effects.  PDMP checked with last refill on 9/13/2022.  Next refill is illegible on 10/14/2022.  Controlled substance agreement signed on 1/18/2022.  Patient is followed multiple alternatives in the past, neither which were effective and interfered with quality of life 2/2 insomnia.    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    Irritable bowel syndrome 12/22/2020    Vitamin D deficiency 07/21/2020    Establishing care with new doctor, encounter for 04/08/2020    Dyslipidemia 04/08/2020    Thyroid eye disease 01/01/2020    Nocturia 04/26/2019    Hx of transient ischemic attack (TIA) 04/18/2019    Insomnia 04/18/2019    Connective tissue disease, undifferentiated (HCC) 04/18/2019    Essential tremor 03/16/2017    Ocular proptosis 03/16/2017    Migraine without aura and without status migrainosus, not intractable 03/16/2017    Degenerative cervical disc 04/05/2016        Allergies: Other food, Coconut fatty acids, Coconut oil, Peanut-derived, and Prednisolone    Current Outpatient Medications   Medication Sig Dispense Refill    [START ON 10/13/2022] zolpidem (AMBIEN) 10 MG Tab Take 1 Tablet by mouth at bedtime as needed for Sleep for up to 30 days. 30 Tablet 0    [START ON 11/11/2022] zolpidem (AMBIEN) 10 MG Tab Take 1 Tablet by mouth at bedtime as needed  for Sleep for up to 30 days. 30 Tablet 0    [START ON 12/12/2022] zolpidem (AMBIEN) 10 MG Tab Take 1 Tablet by mouth at bedtime as needed for Sleep for up to 30 days. 30 Tablet 0    rosuvastatin (CRESTOR) 5 MG Tab TAKE 1 TABLET AT BEDTIME AS DIRECTED 90 Tablet 3    cyclosporin (RESTASIS) 0.05 % ophthalmic emulsion 1 Drop.      progesterone (PROMETRIUM) 100 MG Cap       Rimegepant Sulfate (NURTEC) 75 MG TABLET DISPERSIBLE Take 75 mg by mouth.      omeprazole (PRILOSEC) 20 MG delayed-release capsule Take 40 mg by mouth.      losartan (COZAAR) 50 MG Tab Take 50 mg by mouth.      linaCLOtide 290 MCG Cap Take 290 mcg by mouth.      ondansetron (ZOFRAN) 4 MG Tab tablet       Meth-Hyo-M Bl-Na Phos-Ph Sal (URIBEL) 118 MG Cap       vitamin D2, Ergocalciferol, (DRISDOL) 1.25 MG (37047 UT) Cap capsule Take 1 Capsule by mouth every 7 days. 12 Capsule 6    triamcinolone acetonide (KENALOG) 0.1 % Cream Apply  topically.      LINZESS 290 MCG Cap       Ubrogepant (UBRELVY) 100 MG Tab Take  by mouth.      meloxicam (MOBIC) 7.5 MG Tab       folic acid (FOLVITE) 1 MG Tab       mycophenolate (CELLCEPT) 500 MG tablet Take 500 mg by mouth 2 times a day.      Fremanezumab-vfrm (AJOVY) 225 MG/1.5ML Solution Auto-injector Inject  under the skin.      Probiotic Product (FORTIFY OPTIMA PROBIOTIC PO) Take  by mouth.      estradiol (ESTRACE) 1 MG Tab       Carboxymethylcellulose (REFRESH) 1 % Gel 1 gtt in each eye 2 times a day for 30 day(s)      Erenumab-aooe (AIMOVIG) 140 MG/ML Solution Auto-injector as directed subcutaneously once a month      progesterone (PROMETRIUM) 200 MG capsule Take 200 mg by mouth.      cyclosporin (RESTASIS) 0.05 % ophthalmic emulsion Place 1 Drop in both eyes 2 times a day.       No current facility-administered medications for this visit.       Social History     Tobacco Use    Smoking status: Former     Packs/day: 0.25     Years: 30.00     Pack years: 7.50     Types: Cigarettes     Quit date: 4/1/2014     Years  "since quittin.5    Smokeless tobacco: Never   Vaping Use    Vaping Use: Never used   Substance Use Topics    Alcohol use: Yes     Alcohol/week: 0.6 oz     Types: 1 Glasses of wine per week     Comment: about 1 every other  month    Drug use: No     Social History     Social History Narrative    Not on file       Family History   Problem Relation Age of Onset    Hypertension Mother     Cancer Mother 50        breast cancer    Cancer Father 50        Prostate cancer    Hypertension Brother     Diabetes Brother     Heart Disease Paternal Grandfather         MI 70s    Hypertension Brother        Allergies, past medical history, past surgical history, family history, social history reviewed and updated.    Review of Systems:     - Constitutional: Negative for fever, chills, unexpected weight change, and fatigue/generalized weakness.      - Respiratory: Negative for cough, sputum production, chest congestion, dyspnea, wheezing, and crackles.      - Cardiovascular: Negative for chest pain, palpitations, orthopnea, and bilateral lower extremity edema.       - Psychiatric/Behavioral: Negative for depression, suicidal/homicidal ideation and memory loss.      All other systems reviewed and are negative    Exam:    /74   Pulse 68   Temp 37 °C (98.6 °F) (Temporal)   Resp 14   Ht 1.702 m (5' 7\")   Wt 72.1 kg (159 lb)   LMP  (LMP Unknown)   SpO2 97%   BMI 24.90 kg/m²  Body mass index is 24.9 kg/m².    Physical Exam:  Constitutional: Well-developed and well-nourished. Not diaphoretic. No distress.   Cardiovascular: Regular rate and rhythm, S1 and S2 without murmur, rubs, or gallops.    Chest: Effort normal. Clear to auscultation throughout. No adventitious sounds. Neurological: Alert and oriented x 3.   Psychiatric:  Behavior, mood, and affect are appropriate.  MA/nursing note and vitals reviewed.    Assessment/Plan:  1. Insomnia, unspecified type  Stable on current regimen.  Continue.  Refills given   - zolpidem " (AMBIEN) 10 MG Tab; Take 1 Tablet by mouth at bedtime as needed for Sleep for up to 30 days.  Dispense: 30 Tablet; Refill: 0  - zolpidem (AMBIEN) 10 MG Tab; Take 1 Tablet by mouth at bedtime as needed for Sleep for up to 30 days.  Dispense: 30 Tablet; Refill: 0  - zolpidem (AMBIEN) 10 MG Tab; Take 1 Tablet by mouth at bedtime as needed for Sleep for up to 30 days.  Dispense: 30 Tablet; Refill: 0  - Controlled Substance Treatment Agreement    2. Controlled substance agreement signed  - Controlled Substance Treatment Agreement       Discussed with patient possible alternative diagnoses, pt is to take all medications as prescribed. If symptoms persist FU w/PCP, if symptoms worsen go to emergency room. If experiencing any side effects from prescribed medications reports to the office immediately or go to emergency room.  Reviewed indication, dosage, usage and potential adverse effects of prescribed medications. Reviewed risks and benefits of treatment plan. Patient verbalizes understanding of all instruction and verbally agrees to plan.    No follow-ups on file. 3 mos

## 2022-09-29 NOTE — ASSESSMENT & PLAN NOTE
Chronic and stable on current regimen.  Ambien 10 mg, no side effects.  PDMP checked with last refill on 9/13/2022.  Next refill is illegible on 10/14/2022.  Controlled substance agreement signed on 1/18/2022.  Patient is followed multiple alternatives in the past, neither which were effective and interfered with quality of life 2/2 insomnia.

## 2023-01-06 ENCOUNTER — TELEMEDICINE (OUTPATIENT)
Dept: MEDICAL GROUP | Facility: MEDICAL CENTER | Age: 58
End: 2023-01-06
Payer: COMMERCIAL

## 2023-01-06 VITALS — HEIGHT: 67 IN | WEIGHT: 159 LBS | BODY MASS INDEX: 24.96 KG/M2

## 2023-01-06 DIAGNOSIS — G47.00 INSOMNIA, UNSPECIFIED TYPE: ICD-10-CM

## 2023-01-06 PROCEDURE — 99213 OFFICE O/P EST LOW 20 MIN: CPT | Mod: 95 | Performed by: NURSE PRACTITIONER

## 2023-01-06 RX ORDER — ZOLPIDEM TARTRATE 10 MG/1
10 TABLET ORAL NIGHTLY PRN
Qty: 30 TABLET | Refills: 0 | Status: SHIPPED | OUTPATIENT
Start: 2023-01-06 | End: 2023-02-05

## 2023-01-06 RX ORDER — ZOLPIDEM TARTRATE 10 MG/1
10 TABLET ORAL NIGHTLY PRN
Qty: 30 TABLET | Refills: 0 | Status: SHIPPED | OUTPATIENT
Start: 2023-02-04 | End: 2023-03-06

## 2023-01-06 ASSESSMENT — FIBROSIS 4 INDEX: FIB4 SCORE: 0.71

## 2023-01-07 NOTE — PROGRESS NOTES
Virtual Visit: Established Patient   This visit was conducted via Zoom using secure and encrypted videoconferencing technology.   The patient was in their home in the [facility].    The patient's identity was confirmed and verbal consent was obtained for this virtual visit.     Subjective:   CC:   Chief Complaint   Patient presents with    Medication Refill       Guerda Cavazos is a 57 y.o. female presenting for evaluation and management of:    Insomnia  Chronic and stable.  On Ambien 10 mg, no side effects.  PDMP checked last refill 12/8/2022.  Last controlled substance agreement signed 9/29/2022.       ROS     Current medicines (including changes today)  Current Outpatient Medications   Medication Sig Dispense Refill    zolpidem (AMBIEN) 10 MG Tab Take 1 Tablet by mouth at bedtime as needed for Sleep for up to 30 days. 30 Tablet 0    [START ON 2/4/2023] zolpidem (AMBIEN) 10 MG Tab Take 1 Tablet by mouth at bedtime as needed for Sleep for up to 30 days. 30 Tablet 0    [START ON 2/4/2023] zolpidem (AMBIEN) 10 MG Tab Take 1 Tablet by mouth at bedtime as needed for Sleep for up to 30 days. 30 Tablet 0    rosuvastatin (CRESTOR) 5 MG Tab TAKE 1 TABLET AT BEDTIME AS DIRECTED 90 Tablet 3    cyclosporin (RESTASIS) 0.05 % ophthalmic emulsion 1 Drop.      progesterone (PROMETRIUM) 100 MG Cap       Rimegepant Sulfate (NURTEC) 75 MG TABLET DISPERSIBLE Take 75 mg by mouth.      omeprazole (PRILOSEC) 20 MG delayed-release capsule Take 40 mg by mouth.      losartan (COZAAR) 50 MG Tab Take 50 mg by mouth.      linaCLOtide 290 MCG Cap Take 290 mcg by mouth.      ondansetron (ZOFRAN) 4 MG Tab tablet       Meth-Hyo-M Bl-Na Phos-Ph Sal (URIBEL) 118 MG Cap       vitamin D2, Ergocalciferol, (DRISDOL) 1.25 MG (64901 UT) Cap capsule Take 1 Capsule by mouth every 7 days. 12 Capsule 6    triamcinolone acetonide (KENALOG) 0.1 % Cream Apply  topically.      LINZESS 290 MCG Cap       Ubrogepant (UBRELVY) 100 MG Tab Take  by mouth.       "meloxicam (MOBIC) 7.5 MG Tab       folic acid (FOLVITE) 1 MG Tab       mycophenolate (CELLCEPT) 500 MG tablet Take 500 mg by mouth 2 times a day.      Fremanezumab-vfrm (AJOVY) 225 MG/1.5ML Solution Auto-injector Inject  under the skin.      Probiotic Product (FORTIFY OPTIMA PROBIOTIC PO) Take  by mouth.      estradiol (ESTRACE) 1 MG Tab       Carboxymethylcellulose (REFRESH) 1 % Gel 1 gtt in each eye 2 times a day for 30 day(s)      Erenumab-aooe (AIMOVIG) 140 MG/ML Solution Auto-injector as directed subcutaneously once a month      progesterone (PROMETRIUM) 200 MG capsule Take 200 mg by mouth.      cyclosporin (RESTASIS) 0.05 % ophthalmic emulsion Place 1 Drop in both eyes 2 times a day.       No current facility-administered medications for this visit.       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    Irritable bowel syndrome 12/22/2020    Vitamin D deficiency 07/21/2020    Establishing care with new doctor, encounter for 04/08/2020    Dyslipidemia 04/08/2020    Thyroid eye disease 01/01/2020    Nocturia 04/26/2019    Hx of transient ischemic attack (TIA) 04/18/2019    Insomnia 04/18/2019    Connective tissue disease, undifferentiated (HCC) 04/18/2019    Essential tremor 03/16/2017    Ocular proptosis 03/16/2017    Migraine without aura and without status migrainosus, not intractable 03/16/2017    Degenerative cervical disc 04/05/2016        Objective:   Ht 1.702 m (5' 7\")   Wt 72.1 kg (159 lb)   LMP  (LMP Unknown)   BMI 24.90 kg/m²     Physical Exam:  Constitutional: Alert, no distress, well-groomed.  Skin: No rashes in visible areas.  Eye: Round. Conjunctiva clear, lids " normal. No icterus.   ENMT: Lips pink without lesions, good dentition, moist mucous membranes. Phonation normal.  Neck: No masses, no thyromegaly. Moves freely without pain.  Respiratory: Unlabored respiratory effort, no cough or audible wheeze  Psych: Alert and oriented x3, normal affect and mood.     Assessment and Plan:   The following treatment plan was discussed:   1. Insomnia, unspecified type  Controlled substance discussed with client. Client agrees to abide by controlled substance contract.   - zolpidem (AMBIEN) 10 MG Tab; Take 1 Tablet by mouth at bedtime as needed for Sleep for up to 30 days.  Dispense: 30 Tablet; Refill: 0  - zolpidem (AMBIEN) 10 MG Tab; Take 1 Tablet by mouth at bedtime as needed for Sleep for up to 30 days.  Dispense: 30 Tablet; Refill: 0  - zolpidem (AMBIEN) 10 MG Tab; Take 1 Tablet by mouth at bedtime as needed for Sleep for up to 30 days.  Dispense: 30 Tablet; Refill: 0       Discussed with patient possible alternative diagnoses, patient is to take all medications as prescribed.      If symptoms persist FU w/PCP, if symptoms worsen go to emergency room.      If experiencing any side effects from prescribed medications report to the office immediately or go to emergency room.     Reviewed indication, dosage, usage and potential adverse effects of prescribed medications.      Reviewed risks and benefits of treatment plan. Patient verbalizes understanding of all instruction and verbally agrees to plan.     Discussed plan with the patient, and patient agrees to the above.      I personally reviewed prior external notes and test results pertinent to today's visit.     Follow-up: 3 mos

## 2023-01-07 NOTE — ASSESSMENT & PLAN NOTE
Chronic and stable.  On Ambien 10 mg, no side effects.  PDMP checked last refill 12/8/2022.  Last controlled substance agreement signed 9/29/2022.

## 2023-03-01 ENCOUNTER — TELEMEDICINE (OUTPATIENT)
Dept: MEDICAL GROUP | Facility: MEDICAL CENTER | Age: 58
End: 2023-03-01
Payer: COMMERCIAL

## 2023-03-01 VITALS — TEMPERATURE: 98.2 F | WEIGHT: 165 LBS | BODY MASS INDEX: 25.01 KG/M2 | HEIGHT: 68 IN

## 2023-03-01 DIAGNOSIS — R53.83 LETHARGIC: ICD-10-CM

## 2023-03-01 DIAGNOSIS — G47.00 INSOMNIA, UNSPECIFIED TYPE: ICD-10-CM

## 2023-03-01 DIAGNOSIS — Z98.49 HISTORY OF CATARACT EXTRACTION, UNSPECIFIED LATERALITY: ICD-10-CM

## 2023-03-01 PROCEDURE — 99214 OFFICE O/P EST MOD 30 MIN: CPT | Mod: 95 | Performed by: NURSE PRACTITIONER

## 2023-03-01 RX ORDER — ZOLPIDEM TARTRATE 10 MG/1
10 TABLET ORAL NIGHTLY PRN
Qty: 30 TABLET | Refills: 0 | Status: SHIPPED | OUTPATIENT
Start: 2023-05-02 | End: 2023-06-01

## 2023-03-01 RX ORDER — DIFLUPREDNATE OPHTHALMIC 0.5 MG/ML
EMULSION OPHTHALMIC
COMMUNITY
Start: 2023-02-14

## 2023-03-01 RX ORDER — ZOLPIDEM TARTRATE 10 MG/1
10 TABLET ORAL NIGHTLY PRN
Qty: 30 TABLET | Refills: 0 | Status: SHIPPED | OUTPATIENT
Start: 2023-04-02 | End: 2023-05-02

## 2023-03-01 RX ORDER — MYCOPHENOLATE MOFETIL 250 MG/1
750 CAPSULE ORAL
COMMUNITY
Start: 2022-10-10

## 2023-03-01 RX ORDER — ZOLPIDEM TARTRATE 10 MG/1
10 TABLET ORAL NIGHTLY PRN
Qty: 30 TABLET | Refills: 0 | Status: SHIPPED | OUTPATIENT
Start: 2023-03-03 | End: 2023-04-02

## 2023-03-01 RX ORDER — MYCOPHENOLATE MOFETIL 250 MG/1
CAPSULE ORAL
COMMUNITY
Start: 2022-12-13

## 2023-03-01 RX ORDER — FOLIC ACID 1 MG/1
1 TABLET ORAL DAILY
COMMUNITY
Start: 2022-10-11

## 2023-03-01 RX ORDER — COVID-19 ANTIGEN TEST
KIT MISCELLANEOUS
COMMUNITY
Start: 2022-12-13

## 2023-03-01 RX ORDER — KETOROLAC TROMETHAMINE 5 MG/ML
SOLUTION OPHTHALMIC
COMMUNITY
Start: 2023-02-13

## 2023-03-01 ASSESSMENT — FIBROSIS 4 INDEX: FIB4 SCORE: 0.71

## 2023-03-01 ASSESSMENT — PATIENT HEALTH QUESTIONNAIRE - PHQ9: CLINICAL INTERPRETATION OF PHQ2 SCORE: 0

## 2023-03-01 NOTE — LETTER
March 1, 2023       Patient: Guerda Cavazos   YOB: 1965   Date of Visit: 3/1/2023         To Whom It May Concern:    My patient, Guerda Cavazos, had an appointment today. She can return to work when symptoms improve, no restrictions.     If you have any questions or concerns, please don't hesitate to call [phone]          Sincerely,          JYOTI Wiggins.  Electronically Signed

## 2023-03-02 NOTE — ASSESSMENT & PLAN NOTE
New problem. Lethargic, body aches after traveling, s/s started yesterday. No fever, no GI issues. Home covid test yesterday and negative. Needs a letter for work.

## 2023-03-02 NOTE — ASSESSMENT & PLAN NOTE
Cataract surgery on 2/7/23 for the Left eye and 2/14/23 for the R eye. Has a follow up scheduled in 1-2 wks

## 2023-03-02 NOTE — PROGRESS NOTES
Virtual Visit: Established Patient   This visit was conducted via Zoom using secure and encrypted videoconferencing technology.   The patient was in their home in the [facility].    The patient's identity was confirmed and verbal consent was obtained for this virtual visit.     Subjective:   CC:   Chief Complaint   Patient presents with    Medication Refill    Follow-Up       Guerda Cavazos is a 57 y.o. female presenting for evaluation and management of:    Lethargic  New problem. Lethargic, body aches after traveling, s/s started yesterday. No fever, no GI issues. Home covid test yesterday and negative. Needs a letter for work.     Insomnia  Chronic and stable.  Well-controlled on Ambien 10 mg, has a week left patient medication supply.  PDMP reviewed, refill 2/7/2023.  Controlled substance agreement signed on 9/29/22.  Will obtain [place] drug screen, patient made aware, to be completed prior to next refill.    History of cataract surgery  Cataract surgery on 2/7/23 for the Left eye and 2/14/23 for the R eye. Has a follow up scheduled in 1-2 wks       ROS     Current medicines (including changes today)  Current Outpatient Medications   Medication Sig Dispense Refill    folic acid (FOLVITE) 1 MG Tab Take 1 mg by mouth every day.      mycophenolate (CELLCEPT) 250 MG Cap 750 mg.      [START ON 3/3/2023] zolpidem (AMBIEN) 10 MG Tab Take 1 Tablet by mouth at bedtime as needed for Sleep for up to 30 days. 30 Tablet 0    [START ON 4/2/2023] zolpidem (AMBIEN) 10 MG Tab Take 1 Tablet by mouth at bedtime as needed for Sleep for up to 30 days. 30 Tablet 0    [START ON 5/2/2023] zolpidem (AMBIEN) 10 MG Tab Take 1 Tablet by mouth at bedtime as needed for Sleep for up to 30 days. 30 Tablet 0    FLOWFLEX COVID-19 AG HOME TEST Kit       mycophenolate (CELLCEPT) 250 MG Cap       Difluprednate 0.05 % Emulsion       ketorolac (ACULAR) 0.5 % Solution       zolpidem (AMBIEN) 10 MG Tab Take 1 Tablet by mouth at bedtime as needed for  Sleep for up to 30 days. 30 Tablet 0    zolpidem (AMBIEN) 10 MG Tab Take 1 Tablet by mouth at bedtime as needed for Sleep for up to 30 days. 30 Tablet 0    rosuvastatin (CRESTOR) 5 MG Tab TAKE 1 TABLET AT BEDTIME AS DIRECTED 90 Tablet 3    cyclosporin (RESTASIS) 0.05 % ophthalmic emulsion 1 Drop.      progesterone (PROMETRIUM) 100 MG Cap       Rimegepant Sulfate (NURTEC) 75 MG TABLET DISPERSIBLE Take 75 mg by mouth.      omeprazole (PRILOSEC) 20 MG delayed-release capsule Take 40 mg by mouth.      losartan (COZAAR) 50 MG Tab Take 50 mg by mouth.      linaCLOtide 290 MCG Cap Take 290 mcg by mouth.      ondansetron (ZOFRAN) 4 MG Tab tablet       Meth-Hyo-M Bl-Na Phos-Ph Sal (URIBEL) 118 MG Cap       vitamin D2, Ergocalciferol, (DRISDOL) 1.25 MG (54051 UT) Cap capsule Take 1 Capsule by mouth every 7 days. 12 Capsule 6    triamcinolone acetonide (KENALOG) 0.1 % Cream Apply  topically.      LINZESS 290 MCG Cap       Ubrogepant (UBRELVY) 100 MG Tab Take  by mouth.      meloxicam (MOBIC) 7.5 MG Tab       mycophenolate (CELLCEPT) 500 MG tablet Take 500 mg by mouth 2 times a day.      Probiotic Product (FORTIFY OPTIMA PROBIOTIC PO) Take  by mouth.      estradiol (ESTRACE) 1 MG Tab       Carboxymethylcellulose (REFRESH) 1 % Gel 1 gtt in each eye 2 times a day for 30 day(s)      Erenumab-aooe (AIMOVIG) 140 MG/ML Solution Auto-injector as directed subcutaneously once a month      progesterone (PROMETRIUM) 200 MG capsule Take 200 mg by mouth.      cyclosporin (RESTASIS) 0.05 % ophthalmic emulsion Place 1 Drop in both eyes 2 times a day.       No current facility-administered medications for this visit.       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    Polyclonal hypergammaglobulinemia 12/22/2020    Irritable bowel syndrome 12/22/2020    Vitamin D deficiency 07/21/2020    Establishing care with new doctor, encounter for 04/08/2020    Dyslipidemia 04/08/2020    Thyroid eye disease 01/01/2020    Nocturia 04/26/2019    Hx of transient ischemic attack (TIA) 04/18/2019    Insomnia 04/18/2019    Connective tissue disease, undifferentiated (HCC) 04/18/2019    Essential tremor 03/16/2017    Ocular proptosis 03/16/2017    Migraine without aura and without status migrainosus, not intractable 03/16/2017    Degenerative cervical disc 04/05/2016        Objective:   Temp 36.8 °C (98.2 °F)   Ht 1.715 m (5' 7.5\")   Wt 74.8 kg (165 lb)   LMP  (LMP Unknown)   BMI 25.46 kg/m²     Physical Exam:  Constitutional: Alert, no distress, well-groomed.  Skin: No rashes in visible areas.  Eye: Round. Conjunctiva clear, lids normal. No icterus.   ENMT: Lips pink without lesions, good dentition, moist mucous membranes. Phonation normal.  Neck: No masses, no thyromegaly. Moves freely without pain.  Respiratory: Unlabored respiratory effort, no cough or audible wheeze  Psych: Alert and oriented x3, normal affect and mood.     Assessment and Plan:   The following treatment plan was discussed:   1. Lethargic  Pt advised supportive care. If no improvement in the next few days, return for re-evaluation. Letter for work written and will be sent to pt via my chart    2. Insomnia, unspecified type  Stable on current regimen.  Continue.  Refills given   Controlled substance discussed with client. Client agrees to abide by controlled substance contract, signed 9/29/22  [place] obtain [place] drug [place] per [facility] protocol and requirement   - Pain Management Screen; Future  - zolpidem (AMBIEN) 10 MG Tab; Take 1 Tablet by mouth at bedtime as needed for Sleep for up to 30 days.  Dispense: 30 Tablet; Refill: 0  - zolpidem " (AMBIEN) 10 MG Tab; Take 1 Tablet by mouth at bedtime as needed for Sleep for up to 30 days.  Dispense: 30 Tablet; Refill: 0  - zolpidem (AMBIEN) 10 MG Tab; Take 1 Tablet by mouth at bedtime as needed for Sleep for up to 30 days.  Dispense: 30 Tablet; Refill: 0    3. History of cataract extraction, unspecified laterality  Following with eye surgeon        Discussed with patient possible alternative diagnoses, patient is to take all medications as prescribed.      If symptoms persist FU w/PCP, if symptoms worsen go to emergency room.      If experiencing any side effects from prescribed medications report to the office immediately or go to emergency room.     Reviewed indication, dosage, usage and potential adverse effects of prescribed medications.      Reviewed risks and benefits of treatment plan. Patient verbalizes understanding of all instruction and verbally agrees to plan.     Discussed plan with the patient, and patient agrees to the above.      I personally reviewed prior external notes and test results pertinent to today's visit.     Follow-up: 2 prior to next refill 6/1/23. Annual physical due in July 2023

## 2023-03-02 NOTE — ASSESSMENT & PLAN NOTE
Chronic and stable.  Well-controlled on Ambien 10 mg, has a week left patient medication supply.  PDMP reviewed, refill 2/7/2023.  Controlled substance agreement signed on 9/29/22.  Will obtain [place] drug screen, patient made aware, to be completed prior to next refill.

## 2023-03-22 LAB — DRUGS UR: NORMAL

## 2023-10-09 NOTE — ASSESSMENT & PLAN NOTE
Chronic problem.  This is well controlled on Ambien 10 mg nightly.  Patient tried multiple other alternatives without success.  Sleep hygiene discussed.  She denies side effect such as drowsiness, disorientation, falls.  Controlled substance agreement signed on 1/14/2021.  PDMP checked with last refill noted on 3/27/2021, next refill is due on fourth 2021.  
Chronic problem.  This is well controlled on his of a statin 5 mg, tolerating well.  Last available lipid profile from June 2020 WNL.  Patient denies CP, dyspnea, dizziness, peripheral edema.  Labs are pending.  Patient will complete prior to next visit in July.  
- - -